# Patient Record
Sex: MALE | Race: WHITE | Employment: OTHER | URBAN - METROPOLITAN AREA
[De-identification: names, ages, dates, MRNs, and addresses within clinical notes are randomized per-mention and may not be internally consistent; named-entity substitution may affect disease eponyms.]

---

## 2017-02-20 RX ORDER — SIMVASTATIN 10 MG
TABLET ORAL
Qty: 30 TABLET | Refills: 2 | Status: SHIPPED | OUTPATIENT
Start: 2017-02-20 | End: 2017-06-06

## 2017-02-20 NOTE — TELEPHONE ENCOUNTER
Requesting Simvastatin. Failed protocol: Needs Lipid panel. FILLED: 11/11/16 #30 with 0 refills.   LOV: 7/26/16     RTC: PRN

## 2017-03-02 RX ORDER — METOPROLOL SUCCINATE 25 MG/1
TABLET, EXTENDED RELEASE ORAL
Qty: 90 TABLET | Refills: 0 | Status: SHIPPED | OUTPATIENT
Start: 2017-03-02 | End: 2017-06-03

## 2017-04-11 NOTE — TELEPHONE ENCOUNTER
Pharmacy calling to ask about the refill of lisinopril. pharmacy has not heard back. call pharmacy at [phone]

## 2017-04-12 RX ORDER — LISINOPRIL 5 MG/1
TABLET ORAL
Qty: 30 TABLET | Refills: 2 | Status: SHIPPED | OUTPATIENT
Start: 2017-04-12 | End: 2017-06-06

## 2017-05-03 ENCOUNTER — OFFICE VISIT (OUTPATIENT)
Dept: FAMILY MEDICINE CLINIC | Facility: CLINIC | Age: 57
End: 2017-05-03

## 2017-05-03 VITALS
HEART RATE: 91 BPM | SYSTOLIC BLOOD PRESSURE: 142 MMHG | RESPIRATION RATE: 20 BRPM | BODY MASS INDEX: 32 KG/M2 | DIASTOLIC BLOOD PRESSURE: 90 MMHG | TEMPERATURE: 98 F | OXYGEN SATURATION: 98 % | WEIGHT: 239 LBS

## 2017-05-03 DIAGNOSIS — L02.215 ABSCESS, PERINEUM: Primary | ICD-10-CM

## 2017-05-03 PROCEDURE — 99213 OFFICE O/P EST LOW 20 MIN: CPT | Performed by: FAMILY MEDICINE

## 2017-05-03 RX ORDER — AMOXICILLIN AND CLAVULANATE POTASSIUM 500; 125 MG/1; MG/1
1 TABLET, FILM COATED ORAL 2 TIMES DAILY
Qty: 20 TABLET | Refills: 0 | Status: SHIPPED | OUTPATIENT
Start: 2017-05-03 | End: 2017-05-13

## 2017-05-03 NOTE — PROGRESS NOTES
Over the years has had recurrent pustules develop in the perineum which she is able to express fluid from. This came up again in the last week. A few days ago tried to express the pus and got a small amount.   Now it is gotten larger and become more tende Naproxen  FAMILY HISTORY  Family History   Problem Relation Age of Onset   • crohn's[other] [OTHER] Mother      2009, cancer,    • lung removed[other] [OTHER] Mother      2013   • lung infection[other] [OTHER] Mother    • drug dependence[other] Deshaun ANDREW

## 2017-06-03 RX ORDER — METOPROLOL SUCCINATE 25 MG/1
TABLET, EXTENDED RELEASE ORAL
Qty: 90 TABLET | Refills: 0 | Status: SHIPPED | OUTPATIENT
Start: 2017-06-03 | End: 2017-09-01

## 2017-06-06 RX ORDER — SIMVASTATIN 10 MG
TABLET ORAL
Qty: 30 TABLET | Refills: 2 | Status: SHIPPED | OUTPATIENT
Start: 2017-06-06 | End: 2017-09-04

## 2017-06-06 RX ORDER — LISINOPRIL 5 MG/1
TABLET ORAL
Qty: 30 TABLET | Refills: 2 | Status: SHIPPED | OUTPATIENT
Start: 2017-06-06 | End: 2017-09-19

## 2017-07-26 NOTE — TELEPHONE ENCOUNTER
Note      Refill Viagra 50 mg.        [facility]  [address]  [place] [phone]        Approve/ deny Viagra 50 mg?    #8  Last OV with RC was 05/13/17  Medication last authorized 08/01/16   #8 with 12 additional

## 2017-09-01 RX ORDER — METOPROLOL SUCCINATE 25 MG/1
TABLET, EXTENDED RELEASE ORAL
Qty: 90 TABLET | Refills: 0 | Status: SHIPPED | OUTPATIENT
Start: 2017-09-01 | End: 2017-12-01

## 2017-09-05 RX ORDER — SIMVASTATIN 10 MG
TABLET ORAL
Qty: 30 TABLET | Refills: 2 | Status: SHIPPED | OUTPATIENT
Start: 2017-09-05 | End: 2017-12-16

## 2017-09-05 NOTE — TELEPHONE ENCOUNTER
Last ov was 5/3/17  Last refill simvastatin 10 mg 30 pills on 6/6/17    .   Cholesterol:     Lab Results  Component Value Date   CHOLEST 242 (H) 07/26/2016   CHOLEST 217 (H) 10/26/2015     Lab Results  Component Value Date   HDL 45 (L) 07/26/2016   HDL 65 1

## 2017-09-19 RX ORDER — LISINOPRIL 5 MG/1
TABLET ORAL
Qty: 30 TABLET | Refills: 2 | Status: SHIPPED | OUTPATIENT
Start: 2017-09-19 | End: 2017-12-16

## 2017-12-01 RX ORDER — METOPROLOL SUCCINATE 25 MG/1
TABLET, EXTENDED RELEASE ORAL
Qty: 90 TABLET | Refills: 0 | Status: SHIPPED | OUTPATIENT
Start: 2017-12-01 | End: 2018-03-03

## 2017-12-18 RX ORDER — LISINOPRIL 5 MG/1
TABLET ORAL
Qty: 30 TABLET | Refills: 0 | Status: SHIPPED | OUTPATIENT
Start: 2017-12-18 | End: 2018-01-17

## 2017-12-18 RX ORDER — SIMVASTATIN 10 MG
TABLET ORAL
Qty: 30 TABLET | Refills: 2 | Status: SHIPPED | OUTPATIENT
Start: 2017-12-18 | End: 2018-03-16

## 2018-01-17 RX ORDER — LISINOPRIL 5 MG/1
TABLET ORAL
Qty: 30 TABLET | Refills: 0 | Status: SHIPPED | OUTPATIENT
Start: 2018-01-17 | End: 2018-02-08

## 2018-02-08 RX ORDER — LISINOPRIL 5 MG/1
5 TABLET ORAL
Qty: 30 TABLET | Refills: 0 | Status: SHIPPED | OUTPATIENT
Start: 2018-02-08 | End: 2018-03-16

## 2018-02-08 NOTE — TELEPHONE ENCOUNTER
Last ov was 5/3/17  Last refill lisinopril 5 mg 30 pills 1/17/18        Pt is due for a f/u on his blood pressure. Please call to schedule one    .   Kidney:    Lab Results  Component Value Date   BUN 16 07/26/2016   BUN 15 10/26/2015   BUN 13 04/

## 2018-03-05 RX ORDER — METOPROLOL SUCCINATE 25 MG/1
TABLET, EXTENDED RELEASE ORAL
Qty: 90 TABLET | Refills: 0 | Status: SHIPPED | OUTPATIENT
Start: 2018-03-05 | End: 2018-06-16

## 2018-03-16 RX ORDER — SIMVASTATIN 10 MG
TABLET ORAL
Qty: 30 TABLET | Refills: 0 | Status: SHIPPED | OUTPATIENT
Start: 2018-03-16 | End: 2018-04-24

## 2018-03-16 RX ORDER — LISINOPRIL 5 MG/1
5 TABLET ORAL
Qty: 30 TABLET | Refills: 0 | Status: SHIPPED | OUTPATIENT
Start: 2018-03-16 | End: 2018-04-24

## 2018-04-24 RX ORDER — LISINOPRIL 5 MG/1
5 TABLET ORAL
Qty: 30 TABLET | Refills: 0 | Status: SHIPPED | OUTPATIENT
Start: 2018-04-24 | End: 2018-05-30

## 2018-04-24 RX ORDER — SIMVASTATIN 10 MG
TABLET ORAL
Qty: 30 TABLET | Refills: 0 | Status: SHIPPED | OUTPATIENT
Start: 2018-04-24 | End: 2018-05-30

## 2018-04-26 ENCOUNTER — PATIENT OUTREACH (OUTPATIENT)
Dept: FAMILY MEDICINE CLINIC | Facility: CLINIC | Age: 58
End: 2018-04-26

## 2018-05-30 RX ORDER — SIMVASTATIN 10 MG
TABLET ORAL
Qty: 30 TABLET | Refills: 0 | Status: ON HOLD | OUTPATIENT
Start: 2018-05-30 | End: 2019-03-04

## 2018-05-30 RX ORDER — LISINOPRIL 5 MG/1
5 TABLET ORAL
Qty: 30 TABLET | Refills: 0 | Status: ON HOLD | OUTPATIENT
Start: 2018-05-30 | End: 2019-03-04

## 2018-05-30 NOTE — TELEPHONE ENCOUNTER
Last ov was 5/3/17  Last refill lisinopril 4/24/18  Last refill simvastatin 4/24/18      Pt is due for a f/u on blood pressure. Please call to schedule one.  He will need to be seen fro any additional refills

## 2018-06-11 RX ORDER — METOPROLOL SUCCINATE 25 MG/1
TABLET, EXTENDED RELEASE ORAL
Qty: 90 TABLET | Refills: 0 | OUTPATIENT
Start: 2018-06-11

## 2018-06-18 RX ORDER — METOPROLOL SUCCINATE 25 MG/1
TABLET, EXTENDED RELEASE ORAL
Qty: 90 TABLET | Refills: 0 | Status: SHIPPED | OUTPATIENT
Start: 2018-06-18 | End: 2019-03-15

## 2018-06-27 RX ORDER — SIMVASTATIN 10 MG
TABLET ORAL
Qty: 30 TABLET | Refills: 0 | OUTPATIENT
Start: 2018-06-27

## 2018-06-27 RX ORDER — LISINOPRIL 5 MG/1
5 TABLET ORAL
Qty: 30 TABLET | Refills: 0 | OUTPATIENT
Start: 2018-06-27

## 2018-07-02 RX ORDER — SIMVASTATIN 10 MG
TABLET ORAL
Qty: 30 TABLET | Refills: 0 | OUTPATIENT
Start: 2018-07-02

## 2018-07-02 RX ORDER — LISINOPRIL 5 MG/1
5 TABLET ORAL
Qty: 30 TABLET | Refills: 0 | OUTPATIENT
Start: 2018-07-02

## 2018-09-14 RX ORDER — METOPROLOL SUCCINATE 25 MG/1
TABLET, EXTENDED RELEASE ORAL
Qty: 90 TABLET | Refills: 0 | OUTPATIENT
Start: 2018-09-14

## 2018-09-14 NOTE — TELEPHONE ENCOUNTER
Call patient. I haven't seen him for medical problems in 2 years. Last visit 16 months ago, and that was for a skin issue.

## 2018-10-01 RX ORDER — METOPROLOL SUCCINATE 25 MG/1
TABLET, EXTENDED RELEASE ORAL
Qty: 90 TABLET | Refills: 0 | OUTPATIENT
Start: 2018-10-01

## 2018-10-29 ENCOUNTER — PATIENT OUTREACH (OUTPATIENT)
Dept: FAMILY MEDICINE CLINIC | Facility: CLINIC | Age: 58
End: 2018-10-29

## 2018-12-20 ENCOUNTER — PATIENT OUTREACH (OUTPATIENT)
Dept: FAMILY MEDICINE CLINIC | Facility: CLINIC | Age: 58
End: 2018-12-20

## 2019-01-24 ENCOUNTER — PATIENT OUTREACH (OUTPATIENT)
Dept: FAMILY MEDICINE CLINIC | Facility: CLINIC | Age: 59
End: 2019-01-24

## 2019-02-21 ENCOUNTER — PATIENT OUTREACH (OUTPATIENT)
Dept: FAMILY MEDICINE CLINIC | Facility: CLINIC | Age: 59
End: 2019-02-21

## 2019-02-21 NOTE — PROGRESS NOTES
Please call patient to schedule diabetes follow up with Dr Genie Escalera. A PROTEGOt message has not been read.

## 2019-03-03 ENCOUNTER — APPOINTMENT (OUTPATIENT)
Dept: CT IMAGING | Age: 59
End: 2019-03-03
Attending: EMERGENCY MEDICINE
Payer: COMMERCIAL

## 2019-03-03 ENCOUNTER — APPOINTMENT (OUTPATIENT)
Dept: GENERAL RADIOLOGY | Age: 59
End: 2019-03-03
Attending: EMERGENCY MEDICINE
Payer: COMMERCIAL

## 2019-03-03 ENCOUNTER — HOSPITAL ENCOUNTER (OUTPATIENT)
Facility: HOSPITAL | Age: 59
Setting detail: OBSERVATION
Discharge: HOME | End: 2019-03-04
Attending: EMERGENCY MEDICINE | Admitting: HOSPITALIST
Payer: COMMERCIAL

## 2019-03-03 DIAGNOSIS — R07.9 CHEST PAIN, RULE OUT ACUTE MYOCARDIAL INFARCTION: Primary | ICD-10-CM

## 2019-03-03 LAB
ALBUMIN SERPL-MCNC: 3.8 G/DL (ref 3.4–5)
ALBUMIN/GLOB SERPL: 0.9 {RATIO} (ref 1–2)
ALP LIVER SERPL-CCNC: 61 U/L (ref 45–117)
ALT SERPL-CCNC: 86 U/L (ref 16–61)
ANION GAP SERPL CALC-SCNC: 7 MMOL/L (ref 0–18)
AST SERPL-CCNC: 38 U/L (ref 15–37)
BASOPHILS # BLD AUTO: 0.09 X10(3) UL (ref 0–0.2)
BASOPHILS NFR BLD AUTO: 0.9 %
BILIRUB SERPL-MCNC: 0.2 MG/DL (ref 0.1–2)
BILIRUB UR QL STRIP.AUTO: NEGATIVE
BUN BLD-MCNC: 17 MG/DL (ref 7–18)
BUN/CREAT SERPL: 14.5 (ref 10–20)
CALCIUM BLD-MCNC: 8.7 MG/DL (ref 8.5–10.1)
CHLORIDE SERPL-SCNC: 103 MMOL/L (ref 98–107)
CLARITY UR REFRACT.AUTO: CLEAR
CO2 SERPL-SCNC: 27 MMOL/L (ref 21–32)
COLOR UR AUTO: YELLOW
CREAT BLD-MCNC: 1.17 MG/DL (ref 0.7–1.3)
DEPRECATED RDW RBC AUTO: 45 FL (ref 35.1–46.3)
EOSINOPHIL # BLD AUTO: 0.18 X10(3) UL (ref 0–0.7)
EOSINOPHIL NFR BLD AUTO: 1.7 %
ERYTHROCYTE [DISTWIDTH] IN BLOOD BY AUTOMATED COUNT: 13.2 % (ref 11–15)
GLOBULIN PLAS-MCNC: 4.1 G/DL (ref 2.8–4.4)
GLUCOSE BLD-MCNC: 150 MG/DL (ref 70–99)
GLUCOSE UR STRIP.AUTO-MCNC: 100 MG/DL
HCT VFR BLD AUTO: 47.7 % (ref 39–53)
HGB BLD-MCNC: 16 G/DL (ref 13–17.5)
IMM GRANULOCYTES # BLD AUTO: 0.04 X10(3) UL (ref 0–1)
IMM GRANULOCYTES NFR BLD: 0.4 %
KETONES UR STRIP.AUTO-MCNC: 15 MG/DL
LEUKOCYTE ESTERASE UR QL STRIP.AUTO: NEGATIVE
LIPASE SERPL-CCNC: 93 U/L (ref 73–393)
LYMPHOCYTES # BLD AUTO: 2.49 X10(3) UL (ref 1–4)
LYMPHOCYTES NFR BLD AUTO: 23.6 %
M PROTEIN MFR SERPL ELPH: 7.9 G/DL (ref 6.4–8.2)
MCH RBC QN AUTO: 31.1 PG (ref 26–34)
MCHC RBC AUTO-ENTMCNC: 33.5 G/DL (ref 31–37)
MCV RBC AUTO: 92.6 FL (ref 80–100)
MONOCYTES # BLD AUTO: 0.94 X10(3) UL (ref 0.1–1)
MONOCYTES NFR BLD AUTO: 8.9 %
NEUTROPHILS # BLD AUTO: 6.82 X10 (3) UL (ref 1.5–7.7)
NEUTROPHILS # BLD AUTO: 6.82 X10(3) UL (ref 1.5–7.7)
NEUTROPHILS NFR BLD AUTO: 64.5 %
NITRITE UR QL STRIP.AUTO: NEGATIVE
OSMOLALITY SERPL CALC.SUM OF ELEC: 288 MOSM/KG (ref 275–295)
PH UR STRIP.AUTO: 6.5 [PH] (ref 4.5–8)
PLATELET # BLD AUTO: 274 10(3)UL (ref 150–450)
POTASSIUM SERPL-SCNC: 4.1 MMOL/L (ref 3.5–5.1)
PROT UR STRIP.AUTO-MCNC: NEGATIVE MG/DL
RBC # BLD AUTO: 5.15 X10(6)UL (ref 4.3–5.7)
RBC UR QL AUTO: NEGATIVE
SODIUM SERPL-SCNC: 137 MMOL/L (ref 136–145)
SP GR UR STRIP.AUTO: 1.02 (ref 1–1.03)
TROPONIN I SERPL-MCNC: <0.045 NG/ML (ref ?–0.04)
TROPONIN I SERPL-MCNC: <0.045 NG/ML (ref ?–0.04)
UROBILINOGEN UR STRIP.AUTO-MCNC: 0.2 MG/DL
WBC # BLD AUTO: 10.6 X10(3) UL (ref 4–11)

## 2019-03-03 PROCEDURE — 99220 INITIAL OBSERVATION CARE,LEVL III: CPT | Performed by: HOSPITALIST

## 2019-03-03 PROCEDURE — 71045 X-RAY EXAM CHEST 1 VIEW: CPT | Performed by: EMERGENCY MEDICINE

## 2019-03-03 PROCEDURE — 74176 CT ABD & PELVIS W/O CONTRAST: CPT | Performed by: EMERGENCY MEDICINE

## 2019-03-03 RX ORDER — MAGNESIUM HYDROXIDE/ALUMINUM HYDROXICE/SIMETHICONE 120; 1200; 1200 MG/30ML; MG/30ML; MG/30ML
30 SUSPENSION ORAL ONCE
Status: COMPLETED | OUTPATIENT
Start: 2019-03-03 | End: 2019-03-03

## 2019-03-03 RX ORDER — HYDROMORPHONE HYDROCHLORIDE 1 MG/ML
0.5 INJECTION, SOLUTION INTRAMUSCULAR; INTRAVENOUS; SUBCUTANEOUS ONCE
Status: COMPLETED | OUTPATIENT
Start: 2019-03-03 | End: 2019-03-03

## 2019-03-04 ENCOUNTER — APPOINTMENT (OUTPATIENT)
Dept: CV DIAGNOSTICS | Facility: HOSPITAL | Age: 59
End: 2019-03-04
Attending: HOSPITALIST
Payer: COMMERCIAL

## 2019-03-04 ENCOUNTER — APPOINTMENT (OUTPATIENT)
Dept: CV DIAGNOSTICS | Facility: HOSPITAL | Age: 59
End: 2019-03-04
Attending: INTERNAL MEDICINE
Payer: COMMERCIAL

## 2019-03-04 VITALS
OXYGEN SATURATION: 100 % | BODY MASS INDEX: 32 KG/M2 | RESPIRATION RATE: 18 BRPM | TEMPERATURE: 98 F | SYSTOLIC BLOOD PRESSURE: 150 MMHG | HEART RATE: 83 BPM | DIASTOLIC BLOOD PRESSURE: 89 MMHG | WEIGHT: 239 LBS

## 2019-03-04 LAB
ATRIAL RATE: 78 BPM
ATRIAL RATE: 80 BPM
CHOLEST SMN-MCNC: 233 MG/DL (ref ?–200)
HDLC SERPL-MCNC: 51 MG/DL (ref 40–59)
LDLC SERPL CALC-MCNC: 138 MG/DL (ref ?–100)
NONHDLC SERPL-MCNC: 182 MG/DL (ref ?–130)
P AXIS: 11 DEGREES
P AXIS: 39 DEGREES
P-R INTERVAL: 130 MS
P-R INTERVAL: 136 MS
Q-T INTERVAL: 392 MS
Q-T INTERVAL: 392 MS
QRS DURATION: 92 MS
QRS DURATION: 96 MS
QTC CALCULATION (BEZET): 446 MS
QTC CALCULATION (BEZET): 452 MS
R AXIS: 28 DEGREES
R AXIS: 4 DEGREES
T AXIS: -72 DEGREES
T AXIS: 101 DEGREES
TRIGL SERPL-MCNC: 218 MG/DL (ref 30–149)
TROPONIN I SERPL-MCNC: <0.045 NG/ML (ref ?–0.04)
TROPONIN I SERPL-MCNC: <0.045 NG/ML (ref ?–0.04)
VENTRICULAR RATE: 78 BPM
VENTRICULAR RATE: 80 BPM
VLDLC SERPL CALC-MCNC: 44 MG/DL (ref 0–30)

## 2019-03-04 PROCEDURE — 99217 OBSERVATION CARE DISCHARGE: CPT | Performed by: INTERNAL MEDICINE

## 2019-03-04 PROCEDURE — 93018 CV STRESS TEST I&R ONLY: CPT | Performed by: INTERNAL MEDICINE

## 2019-03-04 PROCEDURE — 78452 HT MUSCLE IMAGE SPECT MULT: CPT | Performed by: INTERNAL MEDICINE

## 2019-03-04 PROCEDURE — 93306 TTE W/DOPPLER COMPLETE: CPT | Performed by: HOSPITALIST

## 2019-03-04 PROCEDURE — 93017 CV STRESS TEST TRACING ONLY: CPT | Performed by: INTERNAL MEDICINE

## 2019-03-04 RX ORDER — ACETAMINOPHEN 325 MG/1
650 TABLET ORAL EVERY 6 HOURS PRN
Status: DISCONTINUED | OUTPATIENT
Start: 2019-03-04 | End: 2019-03-04

## 2019-03-04 RX ORDER — METOPROLOL SUCCINATE 25 MG/1
25 TABLET, EXTENDED RELEASE ORAL ONCE
Status: DISCONTINUED | OUTPATIENT
Start: 2019-03-04 | End: 2019-03-04

## 2019-03-04 RX ORDER — ONDANSETRON 2 MG/ML
4 INJECTION INTRAMUSCULAR; INTRAVENOUS EVERY 6 HOURS PRN
Status: DISCONTINUED | OUTPATIENT
Start: 2019-03-04 | End: 2019-03-04

## 2019-03-04 RX ORDER — SIMVASTATIN 10 MG
10 TABLET ORAL NIGHTLY
Qty: 30 TABLET | Refills: 1 | Status: SHIPPED | OUTPATIENT
Start: 2019-03-04 | End: 2019-04-26

## 2019-03-04 RX ORDER — LORAZEPAM 0.5 MG/1
0.5 TABLET ORAL EVERY 4 HOURS PRN
Status: DISCONTINUED | OUTPATIENT
Start: 2019-03-04 | End: 2019-03-04

## 2019-03-04 RX ORDER — LISINOPRIL 20 MG/1
20 TABLET ORAL
Status: DISCONTINUED | OUTPATIENT
Start: 2019-03-04 | End: 2019-03-04

## 2019-03-04 RX ORDER — METOCLOPRAMIDE HYDROCHLORIDE 5 MG/ML
10 INJECTION INTRAMUSCULAR; INTRAVENOUS EVERY 8 HOURS PRN
Status: DISCONTINUED | OUTPATIENT
Start: 2019-03-04 | End: 2019-03-04

## 2019-03-04 RX ORDER — LISINOPRIL 5 MG/1
5 TABLET ORAL
Status: DISCONTINUED | OUTPATIENT
Start: 2019-03-04 | End: 2019-03-04

## 2019-03-04 RX ORDER — PRAVASTATIN SODIUM 20 MG
20 TABLET ORAL NIGHTLY
Status: DISCONTINUED | OUTPATIENT
Start: 2019-03-04 | End: 2019-03-04

## 2019-03-04 RX ORDER — LISINOPRIL 20 MG/1
20 TABLET ORAL
Qty: 30 TABLET | Refills: 3 | Status: SHIPPED | OUTPATIENT
Start: 2019-03-05 | End: 2019-07-09

## 2019-03-04 RX ORDER — HYDRALAZINE HYDROCHLORIDE 20 MG/ML
10 INJECTION INTRAMUSCULAR; INTRAVENOUS EVERY 6 HOURS PRN
Status: DISCONTINUED | OUTPATIENT
Start: 2019-03-04 | End: 2019-03-04

## 2019-03-04 RX ORDER — METOPROLOL SUCCINATE 25 MG/1
25 TABLET, EXTENDED RELEASE ORAL
Status: DISCONTINUED | OUTPATIENT
Start: 2019-03-04 | End: 2019-03-04

## 2019-03-04 RX ORDER — ENOXAPARIN SODIUM 100 MG/ML
40 INJECTION SUBCUTANEOUS DAILY
Status: DISCONTINUED | OUTPATIENT
Start: 2019-03-04 | End: 2019-03-04

## 2019-03-04 NOTE — PLAN OF CARE
Nuclear stress test negative for reversible ischemia w/ EF ~56%. Echo reviewed, shows no RWMA w/ preserved LVSF. OK for home cardiac perspective. F/u PCP as directed. See APN in Cardiology office 1-2 weeks.   If symptoms recur, will need to give conside

## 2019-03-04 NOTE — PLAN OF CARE
NURSING DISCHARGE NOTE    Discharged Home via Ambulatory. Accompanied by Support staff  Belongings Taken by patient/family     D/c accompanied by RN and spouse.

## 2019-03-04 NOTE — CONSULTS
Cardiology (consult dictated)    Assessment:  1. Atypical chest pain. Normal troponins; labile ST-T abnormalities. 2. HTN    3. DM    4.  Elevated LFTs      Plan:  Stress test    Thank you

## 2019-03-04 NOTE — PROGRESS NOTES
[facility]  Progress Note     Nhi Cruz Patient Status:  Observation    1960 MRN [id number]   [facility] 2NE-A Attending Marilu Albert MD   Hosp Day # 0 PCP Graeme Boxer, MD     Chief Complaint: chest pain    S: Patient pending  3. Echo  4. Cardiology consulted  2. Essential HTN  1. BB/ACEI  3. Dyslipidemia  1.  Statin       Plan of care: await stress test resutls    Quality:  · DVT Prophylaxis: lovenox  · CODE status: Full  · Perez: none  · Central line: none    Estimated

## 2019-03-04 NOTE — ED PROVIDER NOTES
Patient Seen in: THE [facility] Emergency Department In [place]    History   Patient presents with:  Abdomen/Flank Pain (GI/)    Stated Complaint: abd pain     HPI    13-year-old white male who presents emerged from today for complaint of epigastric abdomin HPI.  Constitutional and vital signs reviewed. All other systems reviewed and negative except as noted above.     Physical Exam     ED Triage Vitals [03/03/19 2042]   BP (!) 196/112   Pulse 92   Resp 18   Temp 98.1 °F (36.7 °C)   Temp src Temporal   Sp ---------                               -----------         ------                     CBC W/ DIFFERENTIAL[[id number]]                              Final result                 Please view results for these tests on the individual orders.    RAINBOW DRAW B pulmonary or pleural disease.        Impression     CONCLUSION:    1. Diverticular disease.  No free fluid. 2. The left kidney is not visualized, correlate with surgical removal.  3. Fatty infiltration of the liver.   4. If patient has previous CTs of the provider specified.       Medications Prescribed:  Current Discharge Medication List        Present on Admission  Date Reviewed: 4/29/2016          ICD-10-CM Noted POA    Chest pain, rule out acute myocardial infarction R07.9 3/3/2019 Unknown

## 2019-03-04 NOTE — PLAN OF CARE
CARDIOVASCULAR - ADULT    • Maintains optimal cardiac output and hemodynamic stability Progressing    • Absence of cardiac arrhythmias or at baseline Progressing        Received report from [facility]. Pt's b/p 177/98.  Nurse inquired whether any medica

## 2019-03-04 NOTE — CONSULTS
[facility]    PATIENT'S NAME: Israel Frieda   ATTENDING PHYSICIAN: Jerilee Lefort, MD   CONSULTING PHYSICIAN: Eron Montoya M.D.    PATIENT ACCOUNT#:   [de-identified]    LOCATION:  [address] [place] [address]  MEDICAL RECORD #:   [id number]       DATE OF BIRTH:  [birth year] q.a.m., simvastatin 10 mg at bedtime. ALLERGIES:  Naprosyn, as above. SOCIAL HISTORY:  He is  with 2 children. Works as a  and has high stress from his job.   He drinks 1 cup of coffee a day and has 1 to 2 glasses of wine each pain with some typical but mostly atypical features, now resolved. Normal troponins despite prolonged pain. He does, however, have labile ST and T-wave abnormalities, which may be related to his blood pressure, but could also be a sign of ischemia.   Norm

## 2019-03-04 NOTE — PLAN OF CARE
CARDIOVASCULAR - ADULT    • Maintains optimal cardiac output and hemodynamic stability Progressing    • Absence of cardiac arrhythmias or at baseline Progressing          Denies chest pain, no sob  Troponin normal, lisinopril given for high bp  2d Echo ord

## 2019-03-04 NOTE — H&P
[facility]  History and Physical     [facility] Patient Status:  Emergency    1960 MRN [id number]   [facility] [place] Attending Amy Mtz MD   Hosp Day # 0 PCP Colletta Silos, MD     Chief Complaint Other (lyme disease[other]) Sister    • Other (west nile virus[other]) Sister    • Other (copperhead snake bite[other]) Sister         Allergies:   Naproxen                ANAPHYLAXIS    Comment:Passed out    Medications:    No current facility-administere extremities. Extremities: No edema or cyanosis. Integument: No rashes or lesions. Psychiatric: Appropriate mood and affect.       Diagnostic Data:      Labs:  Recent Labs   Lab  03/03/19   2050   WBC  10.6   HGB  16.0   MCV  92.6   PLT  274.0       Rece

## 2019-03-05 ENCOUNTER — PATIENT OUTREACH (OUTPATIENT)
Dept: CASE MANAGEMENT | Age: 59
End: 2019-03-05

## 2019-03-05 DIAGNOSIS — Z02.9 ENCOUNTERS FOR UNSPECIFIED ADMINISTRATIVE PURPOSE: ICD-10-CM

## 2019-03-05 NOTE — PROGRESS NOTES
Initial Post Discharge Follow Up   Discharge Date: 3/4/19  Contact Date: 3/5/2019    Consent Verification:  Assessment Completed With: Patient  HIPAA Verified?   Yes    Discharge Dx:   Atypical chest pain, benign essential HTN, hyperlipidemia    General: Swabs (ALCOHOL WIPES) Does not apply Pads 1 each by Does not apply route daily.  Disp: 100 each Rfl: 0   Glucose Blood (ONETOUCH VERIO) In Vitro Strip Test twice daily Disp: 100 each Rfl: 2   Blood Glucose Monitoring Suppl ([employer] SYSTEM) W/DEVIC [id number]  [phone]          PCP TCM/HFU appointment: scheduled at D/C within 7-14 days  yes     NCM Reviewed/scheduled/rescheduled PCP TCM/HFU appointment with pt:  Yes      Have you made all of your follow up appointments?  yes    Is there any reason

## 2019-03-13 ENCOUNTER — TELEPHONE (OUTPATIENT)
Dept: FAMILY MEDICINE CLINIC | Facility: CLINIC | Age: 59
End: 2019-03-13

## 2019-03-13 NOTE — DISCHARGE SUMMARY
[facility]  DISCHARGE SUMMARY     Kaiden Hull Patient Status:  Observation    1960 MRN [id number]   [facility] 2NE-A Attending No att. providers found   Hosp Day # 0 PCP Francois Byrd MD     Date of Admission: 3/3/2019  Date o Wipes Pads      1 each by Does not apply route daily. Quantity:  100 each  Refills:  0     aspirin 81 MG Tabs      Take 81 mg by mouth daily.    Refills:  0     Glucose Blood Strp  Commonly known as:  ONETOUCH VERIO      Test twice daily   Quantity:  100

## 2019-03-13 NOTE — TELEPHONE ENCOUNTER
Her  is coming in next week to see Edwinmajo Castrejonman. She would like to let CZ know about some things that she does not think her  will be honest about at his appt. Please call her today.   Her  is not home and doesn't want this call coming in when

## 2019-03-13 NOTE — TELEPHONE ENCOUNTER
Patient has OV scheduled with  next week. Wife is calling to report various concerns. Patient was recently hospitalized over night at THE [facility] for stomach pain/ cardiac testing.  Ella Wayne has not been in to see Henrique Arnold since May 2017    Snoring has

## 2019-03-15 ENCOUNTER — OFFICE VISIT (OUTPATIENT)
Dept: FAMILY MEDICINE CLINIC | Facility: CLINIC | Age: 59
End: 2019-03-15
Payer: COMMERCIAL

## 2019-03-15 ENCOUNTER — APPOINTMENT (OUTPATIENT)
Dept: LAB | Age: 59
End: 2019-03-15
Attending: FAMILY MEDICINE
Payer: COMMERCIAL

## 2019-03-15 VITALS
DIASTOLIC BLOOD PRESSURE: 80 MMHG | WEIGHT: 232 LBS | HEART RATE: 76 BPM | SYSTOLIC BLOOD PRESSURE: 130 MMHG | RESPIRATION RATE: 18 BRPM | BODY MASS INDEX: 30.75 KG/M2 | TEMPERATURE: 98 F | OXYGEN SATURATION: 98 % | HEIGHT: 73 IN

## 2019-03-15 DIAGNOSIS — Z12.5 SCREENING FOR PROSTATE CANCER: ICD-10-CM

## 2019-03-15 DIAGNOSIS — E11.65 UNCONTROLLED TYPE 2 DIABETES MELLITUS WITH HYPERGLYCEMIA (HCC): ICD-10-CM

## 2019-03-15 DIAGNOSIS — Z00.00 ROUTINE GENERAL MEDICAL EXAMINATION AT A HEALTH CARE FACILITY: Primary | ICD-10-CM

## 2019-03-15 DIAGNOSIS — E78.1 HYPERTRIGLYCERIDEMIA: ICD-10-CM

## 2019-03-15 DIAGNOSIS — Z99.89 OSA ON CPAP: ICD-10-CM

## 2019-03-15 DIAGNOSIS — F52.8 PSYCHOSEXUAL DYSFUNCTION WITH INHIBITED SEXUAL EXCITEMENT: ICD-10-CM

## 2019-03-15 DIAGNOSIS — I10 BENIGN ESSENTIAL HTN: ICD-10-CM

## 2019-03-15 DIAGNOSIS — G47.33 OSA ON CPAP: ICD-10-CM

## 2019-03-15 PROBLEM — R07.9 CHEST PAIN, RULE OUT ACUTE MYOCARDIAL INFARCTION: Status: RESOLVED | Noted: 2019-03-03 | Resolved: 2019-03-15

## 2019-03-15 LAB
ALBUMIN SERPL-MCNC: 4 G/DL (ref 3.4–5)
ALBUMIN/GLOB SERPL: 1 {RATIO} (ref 1–2)
ALP LIVER SERPL-CCNC: 59 U/L (ref 45–117)
ALT SERPL-CCNC: 75 U/L (ref 16–61)
ANION GAP SERPL CALC-SCNC: 8 MMOL/L (ref 0–18)
AST SERPL-CCNC: 31 U/L (ref 15–37)
BILIRUB SERPL-MCNC: 0.8 MG/DL (ref 0.1–2)
BUN BLD-MCNC: 20 MG/DL (ref 7–18)
BUN/CREAT SERPL: 13.3 (ref 10–20)
CALCIUM BLD-MCNC: 9.4 MG/DL (ref 8.5–10.1)
CHLORIDE SERPL-SCNC: 104 MMOL/L (ref 98–107)
CHOLEST SMN-MCNC: 174 MG/DL (ref ?–200)
CO2 SERPL-SCNC: 27 MMOL/L (ref 21–32)
COMPLEXED PSA SERPL-MCNC: 0.59 NG/ML (ref ?–4)
CREAT BLD-MCNC: 1.5 MG/DL (ref 0.7–1.3)
CREAT UR-SCNC: 357 MG/DL
EST. AVERAGE GLUCOSE BLD GHB EST-MCNC: 131 MG/DL (ref 68–126)
GLOBULIN PLAS-MCNC: 4.2 G/DL (ref 2.8–4.4)
GLUCOSE BLD-MCNC: 118 MG/DL (ref 70–99)
HBA1C MFR BLD HPLC: 6.2 % (ref ?–5.7)
HDLC SERPL-MCNC: 44 MG/DL (ref 40–59)
LDLC SERPL CALC-MCNC: 104 MG/DL (ref ?–100)
M PROTEIN MFR SERPL ELPH: 8.2 G/DL (ref 6.4–8.2)
MICROALBUMIN UR-MCNC: 1.88 MG/DL
MICROALBUMIN/CREAT 24H UR-RTO: 5.3 UG/MG (ref ?–30)
NONHDLC SERPL-MCNC: 130 MG/DL (ref ?–130)
OSMOLALITY SERPL CALC.SUM OF ELEC: 292 MOSM/KG (ref 275–295)
POTASSIUM SERPL-SCNC: 4.9 MMOL/L (ref 3.5–5.1)
SODIUM SERPL-SCNC: 139 MMOL/L (ref 136–145)
TRIGL SERPL-MCNC: 129 MG/DL (ref 30–149)
VLDLC SERPL CALC-MCNC: 26 MG/DL (ref 0–30)

## 2019-03-15 PROCEDURE — 83036 HEMOGLOBIN GLYCOSYLATED A1C: CPT

## 2019-03-15 PROCEDURE — 82043 UR ALBUMIN QUANTITATIVE: CPT

## 2019-03-15 PROCEDURE — 82570 ASSAY OF URINE CREATININE: CPT

## 2019-03-15 PROCEDURE — 36415 COLL VENOUS BLD VENIPUNCTURE: CPT

## 2019-03-15 PROCEDURE — 99396 PREV VISIT EST AGE 40-64: CPT | Performed by: FAMILY MEDICINE

## 2019-03-15 PROCEDURE — 80053 COMPREHEN METABOLIC PANEL: CPT

## 2019-03-15 PROCEDURE — 80061 LIPID PANEL: CPT

## 2019-03-15 RX ORDER — SILDENAFIL 50 MG/1
50 TABLET, FILM COATED ORAL
Qty: 8 TABLET | Refills: 12 | Status: SHIPPED | OUTPATIENT
Start: 2019-03-15

## 2019-03-15 RX ORDER — METOPROLOL SUCCINATE 25 MG/1
25 TABLET, EXTENDED RELEASE ORAL
Qty: 90 TABLET | Refills: 1 | Status: SHIPPED | OUTPATIENT
Start: 2019-03-15 | End: 2019-09-11

## 2019-03-15 NOTE — PROGRESS NOTES
HPI:  Here for a physical.  Recently in the hospital with epigastric abdominal pain. Responded promptly to GI cocktail.   However abnormalities on the EKG had an ambulance over for a stress echocardiogram.  The echo portion was normal.  He was discharged h Swabs (ALCOHOL WIPES) Does not apply Pads 1 each by Does not apply route daily.  Disp: 100 each Rfl: 0   Glucose Blood (ONETOUCH VERIO) In Vitro Strip Test twice daily Disp: 100 each Rfl: 2   Blood Glucose Monitoring Suppl ([employer] SYSTEM) W/DEVIC complaints of  hemoptysis. GASTROINTESTINAL: See HPI denies hematochezia and melena. No complaints of any new constipation or diarrhea. No complaints of abdominal pain or cramping. Regular stools.   GENITOURINARY: No complaints of dysuria, urgency or freq percussion. ABDOMEN: Soft, nontender, nondistended, NABS x 4 quadrants. No HSM; no masses; no bruits. GENITOURINARY: Scrotum and testes without lesions, no hernias. Penis shaft and glans without lesions, no discharge.   RECTAL: No rectal masses; prostate within the next 30 days as well as to make all appointments for referrals if given within the next 30 days. Patient understands to contact office if unable to do so.

## 2019-03-29 RX ORDER — LISINOPRIL 20 MG/1
20 TABLET ORAL
COMMUNITY
Start: 2019-03-05

## 2019-03-29 RX ORDER — METOPROLOL SUCCINATE 25 MG/1
TABLET, EXTENDED RELEASE ORAL
COMMUNITY
Start: 2018-06-18

## 2019-03-29 RX ORDER — SIMVASTATIN 10 MG
10 TABLET ORAL
COMMUNITY
Start: 2019-03-04

## 2019-04-26 RX ORDER — SIMVASTATIN 10 MG
TABLET ORAL
Qty: 30 TABLET | Refills: 1 | Status: SHIPPED | OUTPATIENT
Start: 2019-04-26 | End: 2019-05-21

## 2019-05-21 RX ORDER — SIMVASTATIN 10 MG
TABLET ORAL
Qty: 30 TABLET | Refills: 0 | Status: SHIPPED | OUTPATIENT
Start: 2019-05-21 | End: 2019-06-13

## 2019-06-13 RX ORDER — SIMVASTATIN 10 MG
TABLET ORAL
Qty: 30 TABLET | Refills: 0 | Status: SHIPPED | OUTPATIENT
Start: 2019-06-13 | End: 2019-07-10

## 2019-07-09 RX ORDER — LISINOPRIL 20 MG/1
20 TABLET ORAL
Qty: 30 TABLET | Refills: 3 | Status: SHIPPED | OUTPATIENT
Start: 2019-07-09 | End: 2019-11-06

## 2019-07-10 RX ORDER — SIMVASTATIN 10 MG
TABLET ORAL
Qty: 30 TABLET | Refills: 0 | Status: SHIPPED | OUTPATIENT
Start: 2019-07-10 | End: 2019-08-02

## 2019-08-02 RX ORDER — SIMVASTATIN 10 MG
TABLET ORAL
Qty: 30 TABLET | Refills: 0 | Status: SHIPPED | OUTPATIENT
Start: 2019-08-02 | End: 2019-11-14

## 2019-09-11 DIAGNOSIS — I10 BENIGN ESSENTIAL HTN: ICD-10-CM

## 2019-09-11 RX ORDER — METOPROLOL SUCCINATE 25 MG/1
TABLET, EXTENDED RELEASE ORAL
Qty: 90 TABLET | Refills: 1 | Status: SHIPPED | OUTPATIENT
Start: 2019-09-11 | End: 2020-01-21

## 2019-09-17 ENCOUNTER — OFFICE VISIT (OUTPATIENT)
Dept: FAMILY MEDICINE CLINIC | Facility: CLINIC | Age: 59
End: 2019-09-17
Payer: COMMERCIAL

## 2019-09-17 VITALS
DIASTOLIC BLOOD PRESSURE: 90 MMHG | HEIGHT: 73 IN | WEIGHT: 247 LBS | BODY MASS INDEX: 32.74 KG/M2 | TEMPERATURE: 98 F | RESPIRATION RATE: 18 BRPM | SYSTOLIC BLOOD PRESSURE: 140 MMHG | HEART RATE: 64 BPM

## 2019-09-17 DIAGNOSIS — E11.9 CONTROLLED TYPE 2 DIABETES MELLITUS WITHOUT COMPLICATION, WITHOUT LONG-TERM CURRENT USE OF INSULIN (HCC): ICD-10-CM

## 2019-09-17 DIAGNOSIS — Z23 FLU VACCINE NEED: ICD-10-CM

## 2019-09-17 DIAGNOSIS — I10 BENIGN ESSENTIAL HTN: Primary | ICD-10-CM

## 2019-09-17 PROCEDURE — 99213 OFFICE O/P EST LOW 20 MIN: CPT | Performed by: FAMILY MEDICINE

## 2019-09-17 PROCEDURE — 90686 IIV4 VACC NO PRSV 0.5 ML IM: CPT | Performed by: FAMILY MEDICINE

## 2019-09-17 PROCEDURE — 90471 IMMUNIZATION ADMIN: CPT | Performed by: FAMILY MEDICINE

## 2019-09-17 NOTE — PROGRESS NOTES
Here in follow-up for hypertension. He is retiring in a week and a half. He has not been using his CPAP because he works almost 12 hours a day and then comes home eats dinner lays down on the couch where he falls asleep. CPAP is upstairs by the bed.   Af aspirin 81 MG Oral Tab Take 81 mg by mouth daily. Disp:  Rfl:    Alcohol Swabs (ALCOHOL WIPES) Does not apply Pads 1 each by Does not apply route daily.  Disp: 100 each Rfl: 0   Glucose Blood (ONETOUCH VERIO) In Vitro Strip Test twice daily Disp: 100 each next year. Better use of CPAP and more exercise to lower the blood pressure. Will possibly be moving to [place] where his wife's family and many of her friends live.   There is also concern about [address] where there is son lives or staying in [facility]

## 2019-09-17 NOTE — PATIENT INSTRUCTIONS
Expect bp to improve with better use of cpap and more exercise once you are retired. Congratulations,.

## 2019-09-18 LAB
ALBUMIN/GLOBULIN RATIO: 1.5 (CALC) (ref 1–2.5)
ALBUMIN: 4.4 G/DL (ref 3.6–5.1)
ALKALINE PHOSPHATASE: 57 U/L (ref 40–115)
ALT: 60 U/L (ref 9–46)
AST: 29 U/L (ref 10–35)
BILIRUBIN, TOTAL: 0.8 MG/DL (ref 0.2–1.2)
BUN: 21 MG/DL (ref 7–25)
CALCIUM: 9.5 MG/DL (ref 8.6–10.3)
CARBON DIOXIDE: 28 MMOL/L (ref 20–32)
CHLORIDE: 102 MMOL/L (ref 98–110)
CHOL/HDLC RATIO: 3.7 (CALC)
CHOLESTEROL, TOTAL: 183 MG/DL
CREATININE: 1.24 MG/DL (ref 0.7–1.25)
EGFR IF AFRICN AM: 73 ML/MIN/1.73M2
EGFR IF NONAFRICN AM: 63 ML/MIN/1.73M2
GLOBULIN: 2.9 G/DL (CALC) (ref 1.9–3.7)
GLUCOSE: 168 MG/DL (ref 65–99)
HDL CHOLESTEROL: 50 MG/DL
HEMOGLOBIN A1C: 7.1 % OF TOTAL HGB
LDL-CHOLESTEROL: 92 MG/DL (CALC)
NON-HDL CHOLESTEROL: 133 MG/DL (CALC)
POTASSIUM: 4.8 MMOL/L (ref 3.5–5.3)
PROTEIN, TOTAL: 7.3 G/DL (ref 6.1–8.1)
SODIUM: 139 MMOL/L (ref 135–146)
TRIGLYCERIDES: 290 MG/DL

## 2019-11-06 RX ORDER — LISINOPRIL 20 MG/1
TABLET ORAL
Qty: 90 TABLET | Refills: 1 | Status: SHIPPED | OUTPATIENT
Start: 2019-11-06 | End: 2020-04-19

## 2019-11-15 RX ORDER — SIMVASTATIN 10 MG
10 TABLET ORAL NIGHTLY
Qty: 30 TABLET | Refills: 0 | Status: SHIPPED | OUTPATIENT
Start: 2019-11-15 | End: 2019-12-09

## 2019-12-09 RX ORDER — SIMVASTATIN 10 MG
TABLET ORAL
Qty: 30 TABLET | Refills: 0 | Status: SHIPPED | OUTPATIENT
Start: 2019-12-09 | End: 2020-01-21

## 2020-01-21 DIAGNOSIS — I10 BENIGN ESSENTIAL HTN: ICD-10-CM

## 2020-01-21 RX ORDER — SIMVASTATIN 10 MG
10 TABLET ORAL NIGHTLY
Qty: 30 TABLET | Refills: 0 | Status: SHIPPED | OUTPATIENT
Start: 2020-01-21 | End: 2020-02-10

## 2020-01-21 RX ORDER — METOPROLOL SUCCINATE 25 MG/1
25 TABLET, EXTENDED RELEASE ORAL
Qty: 90 TABLET | Refills: 1 | Status: SHIPPED | OUTPATIENT
Start: 2020-01-21 | End: 2020-10-01

## 2020-02-10 ENCOUNTER — PATIENT MESSAGE (OUTPATIENT)
Dept: FAMILY MEDICINE CLINIC | Facility: CLINIC | Age: 60
End: 2020-02-10

## 2020-02-10 RX ORDER — SIMVASTATIN 10 MG
10 TABLET ORAL NIGHTLY
Qty: 90 TABLET | Refills: 0 | Status: SHIPPED | OUTPATIENT
Start: 2020-02-10 | End: 2020-02-14

## 2020-02-10 NOTE — TELEPHONE ENCOUNTER
From: Jennifer Mccray  To: Yusuf Kngiht MD  Sent: 2/10/2020 8:25 AM CST  Subject: Prescription Question    Could you please make my refill for Simvastatin 10MG tablet be a 90 day refill. The insurance prefers this instead of 30 days.  I spoke to the [facility]

## 2020-02-10 NOTE — TELEPHONE ENCOUNTER
Refill of Simvastatin OK?     LOV 9/17/19  Last refill 1/20/2020  QTY 30  Refills 0  Last labs 9/17/19    Requests 90

## 2020-02-14 RX ORDER — SIMVASTATIN 10 MG
TABLET ORAL
Qty: 30 TABLET | Refills: 0 | Status: SHIPPED | OUTPATIENT
Start: 2020-02-14 | End: 2020-05-15

## 2020-04-19 RX ORDER — LISINOPRIL 20 MG/1
TABLET ORAL
Qty: 90 TABLET | Refills: 0 | Status: SHIPPED | OUTPATIENT
Start: 2020-04-19 | End: 2020-07-08

## 2020-05-15 RX ORDER — SIMVASTATIN 10 MG
TABLET ORAL
Qty: 90 TABLET | Refills: 0 | Status: SHIPPED | OUTPATIENT
Start: 2020-05-15 | End: 2020-07-30

## 2020-07-08 RX ORDER — LISINOPRIL 20 MG/1
20 TABLET ORAL DAILY
Qty: 90 TABLET | Refills: 0 | Status: SHIPPED | OUTPATIENT
Start: 2020-07-08 | End: 2020-09-29

## 2020-07-30 RX ORDER — SIMVASTATIN 10 MG
TABLET ORAL
Qty: 30 TABLET | Refills: 0 | Status: SHIPPED | OUTPATIENT
Start: 2020-07-30 | End: 2020-08-25

## 2020-08-25 RX ORDER — SIMVASTATIN 10 MG
TABLET ORAL
Qty: 30 TABLET | Refills: 0 | Status: SHIPPED | OUTPATIENT
Start: 2020-08-25 | End: 2020-09-24

## 2020-09-24 RX ORDER — SIMVASTATIN 10 MG
TABLET ORAL
Qty: 30 TABLET | Refills: 0 | Status: SHIPPED | OUTPATIENT
Start: 2020-09-24 | End: 2021-09-23

## 2020-09-24 NOTE — TELEPHONE ENCOUNTER
Rx Request  SIMVASTATIN 10 MG Oral Tab    Disp:    30                R: 0    Last Visit: 09/17/2019    Last Refilled: 08/25/2020    Protocol Passed?  Yes[  ]       No[ x ]
normal...

## 2020-09-29 RX ORDER — LISINOPRIL 20 MG/1
TABLET ORAL
Qty: 30 TABLET | Refills: 0 | Status: SHIPPED | OUTPATIENT
Start: 2020-09-29 | End: 2021-01-15

## 2020-09-29 NOTE — TELEPHONE ENCOUNTER
Call home number listed - person stated that it was a wrong number. Left message on his work number to call back to [facility] f/u appt.

## 2020-10-01 DIAGNOSIS — I10 BENIGN ESSENTIAL HTN: ICD-10-CM

## 2020-10-01 RX ORDER — METOPROLOL SUCCINATE 25 MG/1
TABLET, EXTENDED RELEASE ORAL
Qty: 90 TABLET | Refills: 0 | Status: SHIPPED | OUTPATIENT
Start: 2020-10-01 | End: 2020-12-30

## 2020-10-01 NOTE — TELEPHONE ENCOUNTER
Last ov 9/17/2019    Last refill 1/21/2020        . Pt is due for a physical. Please call to schedule one . He does not check [employer] message.  He will need to be seen for any additional refills

## 2020-10-05 NOTE — TELEPHONE ENCOUNTER
LM of work number as cell/home number is not correct.   Requested patient to call the office to schedule a physical.

## 2020-10-20 RX ORDER — SIMVASTATIN 10 MG
TABLET ORAL
Qty: 30 TABLET | Refills: 0 | OUTPATIENT
Start: 2020-10-20

## 2020-10-23 DIAGNOSIS — I10 BENIGN ESSENTIAL HTN: ICD-10-CM

## 2020-10-23 RX ORDER — METOPROLOL SUCCINATE 25 MG/1
TABLET, EXTENDED RELEASE ORAL
Qty: 90 TABLET | Refills: 0 | OUTPATIENT
Start: 2020-10-23

## 2020-10-23 RX ORDER — LISINOPRIL 20 MG/1
TABLET ORAL
Qty: 30 TABLET | Refills: 0 | OUTPATIENT
Start: 2020-10-23

## 2020-10-23 RX ORDER — SIMVASTATIN 10 MG
TABLET ORAL
Qty: 90 TABLET | Refills: 0 | OUTPATIENT
Start: 2020-10-23

## 2020-11-06 RX ORDER — LISINOPRIL 20 MG/1
TABLET ORAL
Qty: 30 TABLET | Refills: 0 | OUTPATIENT
Start: 2020-11-06

## 2020-11-28 RX ORDER — SIMVASTATIN 10 MG
TABLET ORAL
Qty: 30 TABLET | Refills: 0 | OUTPATIENT
Start: 2020-11-28

## 2020-12-30 DIAGNOSIS — I10 BENIGN ESSENTIAL HTN: ICD-10-CM

## 2020-12-30 RX ORDER — METOPROLOL SUCCINATE 25 MG/1
TABLET, EXTENDED RELEASE ORAL
Qty: 90 TABLET | Refills: 0 | Status: SHIPPED | OUTPATIENT
Start: 2020-12-30 | End: 2021-03-29

## 2021-01-02 RX ORDER — LISINOPRIL 20 MG/1
TABLET ORAL
Qty: 30 TABLET | Refills: 0 | OUTPATIENT
Start: 2021-01-02

## 2021-01-14 ENCOUNTER — TELEPHONE (OUTPATIENT)
Dept: FAMILY MEDICINE CLINIC | Facility: CLINIC | Age: 61
End: 2021-01-14

## 2021-01-14 NOTE — TELEPHONE ENCOUNTER
Called home/mobile number and a woman answered. Confirmed number, she said there is no one by the name Hannah Roque there. Called # listed as work # and no answer. Unable to LM. Called wife's number and spoke with her.  She said pt phone has changed and ga

## 2021-01-14 NOTE — TELEPHONE ENCOUNTER
Kelsey Miranda, LCSW 15 minutes ago (9:55 AM)        Pt left voicemail for LCSW, states he is out of medications and is not comfortable scheduling with Dr. Guzman Drilling until [place] pandemic is resolved.  Will forward to [facility] staff.         (do not have

## 2021-01-15 ENCOUNTER — TELEMEDICINE (OUTPATIENT)
Dept: FAMILY MEDICINE CLINIC | Facility: CLINIC | Age: 61
End: 2021-01-15
Payer: COMMERCIAL

## 2021-01-15 DIAGNOSIS — E11.9 CONTROLLED TYPE 2 DIABETES MELLITUS WITHOUT COMPLICATION, WITHOUT LONG-TERM CURRENT USE OF INSULIN (HCC): ICD-10-CM

## 2021-01-15 DIAGNOSIS — I10 BENIGN ESSENTIAL HTN: Primary | ICD-10-CM

## 2021-01-15 DIAGNOSIS — E78.1 HYPERTRIGLYCERIDEMIA: ICD-10-CM

## 2021-01-15 PROCEDURE — 99213 OFFICE O/P EST LOW 20 MIN: CPT | Performed by: FAMILY MEDICINE

## 2021-01-15 RX ORDER — LISINOPRIL 20 MG/1
20 TABLET ORAL DAILY
Qty: 90 TABLET | Refills: 0 | Status: SHIPPED | OUTPATIENT
Start: 2021-01-15 | End: 2021-04-12

## 2021-01-15 NOTE — TELEPHONE ENCOUNTER
Will discuss at visit, will need to arrange some kind of labs at least. In addition to discussion.     Lupe Blanton MD

## 2021-01-15 NOTE — PROGRESS NOTES
This visit is conducted using audio only    Patient has been referred to the [facility] website at [email or web address] to review the yearly Consent to Treat document.     Patient understands and accepts financial responsibility for any deductible, co-insuran visit. Labs:          Meds:   Current Outpatient Medications   Medication Sig Dispense Refill   • lisinopril 20 MG Oral Tab Take 1 tablet (20 mg total) by mouth daily.  90 tablet 0   • METOPROLOL SUCCINATE ER 25 MG Oral Tablet 24 Hr TAKE 1 TABLET BY

## 2021-03-29 DIAGNOSIS — I10 BENIGN ESSENTIAL HTN: ICD-10-CM

## 2021-03-29 RX ORDER — METOPROLOL SUCCINATE 25 MG/1
TABLET, EXTENDED RELEASE ORAL
Qty: 90 TABLET | Refills: 0 | Status: SHIPPED | OUTPATIENT
Start: 2021-03-29 | End: 2021-08-02

## 2021-03-29 NOTE — TELEPHONE ENCOUNTER
Rx Request  METOPROLOL SUCCINATE ER 25 MG Oral Tablet 24 Hr    Disp:      90              R: 0    Associated Dx: Benign essential HTN    Last Refilled: 12/30/2020    Last Visit: 01/15/2021    Protocol Passed?  Yes[  ]       No[ x ]

## 2021-04-11 DIAGNOSIS — I10 BENIGN ESSENTIAL HTN: ICD-10-CM

## 2021-04-12 RX ORDER — LISINOPRIL 20 MG/1
TABLET ORAL
Qty: 90 TABLET | Refills: 0 | Status: ON HOLD | OUTPATIENT
Start: 2021-04-12 | End: 2021-06-22

## 2021-05-18 ENCOUNTER — TELEPHONE (OUTPATIENT)
Dept: FAMILY MEDICINE CLINIC | Facility: CLINIC | Age: 61
End: 2021-05-18

## 2021-06-21 ENCOUNTER — HOSPITAL ENCOUNTER (INPATIENT)
Facility: HOSPITAL | Age: 61
LOS: 1 days | Discharge: HOME | DRG: 916 | End: 2021-06-22
Attending: EMERGENCY MEDICINE | Admitting: HOSPITALIST
Payer: COMMERCIAL

## 2021-06-21 DIAGNOSIS — T78.3XXA ANGIOEDEMA, INITIAL ENCOUNTER: Primary | ICD-10-CM

## 2021-06-21 RX ORDER — DIPHENHYDRAMINE HYDROCHLORIDE 50 MG/ML
25 INJECTION INTRAMUSCULAR; INTRAVENOUS ONCE
Status: DISCONTINUED | OUTPATIENT
Start: 2021-06-21 | End: 2021-06-21

## 2021-06-21 RX ORDER — METHYLPREDNISOLONE SODIUM SUCCINATE 125 MG/2ML
125 INJECTION, POWDER, LYOPHILIZED, FOR SOLUTION INTRAMUSCULAR; INTRAVENOUS ONCE
Status: DISCONTINUED | OUTPATIENT
Start: 2021-06-21 | End: 2021-06-22

## 2021-06-21 RX ORDER — DIPHENHYDRAMINE HYDROCHLORIDE 50 MG/ML
INJECTION INTRAMUSCULAR; INTRAVENOUS
Status: DISCONTINUED
Start: 2021-06-21 | End: 2021-06-21

## 2021-06-21 RX ORDER — FAMOTIDINE 10 MG/ML
20 INJECTION, SOLUTION INTRAVENOUS ONCE
Status: COMPLETED | OUTPATIENT
Start: 2021-06-21 | End: 2021-06-21

## 2021-06-21 RX ORDER — METHYLPREDNISOLONE SODIUM SUCCINATE 125 MG/2ML
INJECTION, POWDER, LYOPHILIZED, FOR SOLUTION INTRAMUSCULAR; INTRAVENOUS
Status: COMPLETED
Start: 2021-06-21 | End: 2021-06-21

## 2021-06-21 RX ORDER — FAMOTIDINE 10 MG/ML
INJECTION, SOLUTION INTRAVENOUS
Status: COMPLETED
Start: 2021-06-21 | End: 2021-06-21

## 2021-06-21 RX ORDER — DEXAMETHASONE SODIUM PHOSPHATE 10 MG/ML
10 INJECTION, SOLUTION INTRAMUSCULAR; INTRAVENOUS ONCE
Status: DISCONTINUED | OUTPATIENT
Start: 2021-06-21 | End: 2021-06-22

## 2021-06-22 VITALS
RESPIRATION RATE: 23 BRPM | BODY MASS INDEX: 29.96 KG/M2 | SYSTOLIC BLOOD PRESSURE: 150 MMHG | TEMPERATURE: 98 F | HEIGHT: 74 IN | OXYGEN SATURATION: 94 % | HEART RATE: 68 BPM | DIASTOLIC BLOOD PRESSURE: 88 MMHG | WEIGHT: 233.44 LBS

## 2021-06-22 PROBLEM — R73.9 HYPERGLYCEMIA: Status: ACTIVE | Noted: 2021-06-22

## 2021-06-22 PROBLEM — T78.3XXA ANGIOEDEMA, INITIAL ENCOUNTER: Status: ACTIVE | Noted: 2021-06-22

## 2021-06-22 PROBLEM — T78.3XXA ANGIO-EDEMA: Status: ACTIVE | Noted: 2021-06-22

## 2021-06-22 PROCEDURE — 3051F HG A1C>EQUAL 7.0%<8.0%: CPT | Performed by: FAMILY MEDICINE

## 2021-06-22 PROCEDURE — 99223 1ST HOSP IP/OBS HIGH 75: CPT | Performed by: HOSPITALIST

## 2021-06-22 RX ORDER — BISACODYL 10 MG
10 SUPPOSITORY, RECTAL RECTAL
Status: DISCONTINUED | OUTPATIENT
Start: 2021-06-22 | End: 2021-06-22

## 2021-06-22 RX ORDER — METOPROLOL SUCCINATE 25 MG/1
25 TABLET, EXTENDED RELEASE ORAL DAILY
Status: DISCONTINUED | OUTPATIENT
Start: 2021-06-22 | End: 2021-06-22

## 2021-06-22 RX ORDER — PROCHLORPERAZINE EDISYLATE 5 MG/ML
5 INJECTION INTRAMUSCULAR; INTRAVENOUS EVERY 8 HOURS PRN
Status: DISCONTINUED | OUTPATIENT
Start: 2021-06-22 | End: 2021-06-22

## 2021-06-22 RX ORDER — ACETAMINOPHEN 325 MG/1
650 TABLET ORAL EVERY 6 HOURS PRN
Status: DISCONTINUED | OUTPATIENT
Start: 2021-06-22 | End: 2021-06-22

## 2021-06-22 RX ORDER — AMLODIPINE BESYLATE 10 MG/1
10 TABLET ORAL DAILY
Qty: 60 TABLET | Refills: 0 | Status: SHIPPED | OUTPATIENT
Start: 2021-06-22 | End: 2021-08-23

## 2021-06-22 RX ORDER — AMLODIPINE BESYLATE 5 MG/1
10 TABLET ORAL DAILY
Status: DISCONTINUED | OUTPATIENT
Start: 2021-06-22 | End: 2021-06-22

## 2021-06-22 RX ORDER — DIPHENHYDRAMINE HYDROCHLORIDE 50 MG/ML
12.5 INJECTION INTRAMUSCULAR; INTRAVENOUS EVERY 4 HOURS PRN
Status: DISCONTINUED | OUTPATIENT
Start: 2021-06-22 | End: 2021-06-22

## 2021-06-22 RX ORDER — METHYLPREDNISOLONE SODIUM SUCCINATE 125 MG/2ML
60 INJECTION, POWDER, LYOPHILIZED, FOR SOLUTION INTRAMUSCULAR; INTRAVENOUS EVERY 12 HOURS
Status: DISCONTINUED | OUTPATIENT
Start: 2021-06-22 | End: 2021-06-22

## 2021-06-22 RX ORDER — DEXTROSE MONOHYDRATE 25 G/50ML
50 INJECTION, SOLUTION INTRAVENOUS
Status: DISCONTINUED | OUTPATIENT
Start: 2021-06-22 | End: 2021-06-22

## 2021-06-22 RX ORDER — ONDANSETRON 2 MG/ML
4 INJECTION INTRAMUSCULAR; INTRAVENOUS EVERY 6 HOURS PRN
Status: DISCONTINUED | OUTPATIENT
Start: 2021-06-22 | End: 2021-06-22

## 2021-06-22 RX ORDER — POLYETHYLENE GLYCOL 3350 17 G/17G
17 POWDER, FOR SOLUTION ORAL DAILY PRN
Status: DISCONTINUED | OUTPATIENT
Start: 2021-06-22 | End: 2021-06-22

## 2021-06-22 RX ORDER — FAMOTIDINE 20 MG/1
40 TABLET ORAL DAILY
Qty: 10 TABLET | Refills: 0 | Status: SHIPPED | OUTPATIENT
Start: 2021-06-22 | End: 2021-06-27

## 2021-06-22 RX ORDER — AMLODIPINE BESYLATE 10 MG/1
10 TABLET ORAL DAILY
Qty: 60 TABLET | Refills: 0 | Status: SHIPPED | OUTPATIENT
Start: 2021-06-22 | End: 2021-06-22

## 2021-06-22 RX ORDER — FAMOTIDINE 10 MG/ML
20 INJECTION, SOLUTION INTRAVENOUS 2 TIMES DAILY
Status: DISCONTINUED | OUTPATIENT
Start: 2021-06-22 | End: 2021-06-22

## 2021-06-22 RX ORDER — PREDNISONE 20 MG/1
40 TABLET ORAL DAILY
Qty: 10 TABLET | Refills: 0 | Status: SHIPPED | OUTPATIENT
Start: 2021-06-22 | End: 2021-06-27

## 2021-06-22 RX ORDER — ASPIRIN 81 MG/1
81 TABLET ORAL DAILY
Status: DISCONTINUED | OUTPATIENT
Start: 2021-06-22 | End: 2021-06-22

## 2021-06-22 RX ORDER — DIPHENHYDRAMINE HCL 25 MG
25 CAPSULE ORAL EVERY 4 HOURS PRN
Status: DISCONTINUED | OUTPATIENT
Start: 2021-06-22 | End: 2021-06-22

## 2021-06-22 RX ORDER — DOCUSATE SODIUM 100 MG/1
100 CAPSULE, LIQUID FILLED ORAL 2 TIMES DAILY
Status: DISCONTINUED | OUTPATIENT
Start: 2021-06-22 | End: 2021-06-22

## 2021-06-22 RX ORDER — PRAVASTATIN SODIUM 20 MG
20 TABLET ORAL NIGHTLY
Refills: 0 | Status: DISCONTINUED | OUTPATIENT
Start: 2021-06-22 | End: 2021-06-22

## 2021-06-22 RX ORDER — SODIUM PHOSPHATE, DIBASIC AND SODIUM PHOSPHATE, MONOBASIC 7; 19 G/133ML; G/133ML
1 ENEMA RECTAL ONCE AS NEEDED
Status: DISCONTINUED | OUTPATIENT
Start: 2021-06-22 | End: 2021-06-22

## 2021-06-22 NOTE — PROGRESS NOTES
Patient presented to the ED with tongue swelling with rapid progression posteriorly, difficulty swallowing, drooling and unable to talk. He woke his wife and had her bring him in emergently. No known exposures, new foods, or new medications.   Reports hav

## 2021-06-22 NOTE — ED INITIAL ASSESSMENT (HPI)
Arrives from home with angioedema of tongue. Left> right that started just PTA, pt on lisinopril at home. Difficulty speaking and swallowing.  States he feels like the swelling is moving down the back of hs throat

## 2021-06-22 NOTE — CONSULTS
Laureano Anderson [address] [facility] [address] Note [facility]  Report of Consultation    Elizabeth Pabon Patient Status:  Inpatient    1960 MRN [id number]   [facility] 4SW-A Attending Shahnaz Carvajal • OTHER  10/11    card cath post proc, no stents    • REPAIR CRUCIATE [place]  1/1/98    right x4   • TOTAL KNEE REPLACEMENT       Family History   Problem Relation Age of Onset   • Other (crohn's) Mother         2009, cancer,    • Other (lung dejan fatigue, orthopnea, paroxysmal nocturnal dyspnea, lower extremity edema. Gastrointestinal: Negative for odynophagia, reflux symptoms, nausea, vomiting, change in bowel habits, diarrhea, constipation and abdominal pain.  See HPI  Integument/breast: Negative focal deficits  HEENT: Atraumatic, normocephalic, EOMI, no icterus/hemorrhage, no conjunctival injection/discharge, nares normal  MOUTH: MMM, good dentition  NECK: Trachea midline, symmetric, no visible masses or scars, no crepitus, normal flexion/extensio status and airway; now improved and remains on RA  Continue prednisone and antihistamine - will plan to complete 5 day regimen  Advance diet  Follow fluid balance, lytes, urine output  Mobilize as able  Discussed with patient to avoid any ACEI regimen in t

## 2021-06-22 NOTE — PLAN OF CARE
NURSING DISCHARGE NOTE    Discharged home via wheelchair. Accompanied by wife  Belongings taken by patient. IV and tele discontinued. Discharge instructions, meds, follow up appt explained to patient and wife with understanding verbalized.  Rx given t

## 2021-06-22 NOTE — H&P
[facility]  History and Physical     Mary Hudson Patient Status:  Emergency    1960 MRN [id number]   Location [address] [facility] Attending Fe Land, [address] [place] [address] Day # 0 PCP Jeramie Vail MD     Chief Complaint: Age of Onset   • Other (crohn's) Mother         2009, cancer,    • Other (lung removed) Mother         2013   • Other (lung infection) Mother    • Other (alzheimer's dementia) Mother    • Other (drug dependence) Son    • Cancer Father         colon and andre mucous membranes. EOM-I. PERRLA. Anicteric. Neck: No lymphadenopathy. No JVD. No carotid bruits. Respiratory: Clear to auscultation bilaterally. No wheezes. No rhonchi. Cardiovascular: S1, S2. Regular rate and rhythm. No murmurs, rubs or gallops.  Equal SCDs  · CODE status: Full  · Perez: None  · If COVID testing is negative, may discontinue isolation: Yes    Plan of care discussed with patient at bedside.     Ayde Champion DO  6/22/2021

## 2021-06-22 NOTE — ED QUICK NOTES
Assumed care for this pt, alert awake, breathing easy & non labored. Able to vocalize better from previous per pt. Still with swelling to L tongue, les tightness to throat. Received with NS 1L infusing only.  To monitor

## 2021-06-22 NOTE — ED PROVIDER NOTES
Patient Seen in: [facility] Emergency Department      History   Patient presents with:   Allergic Rxn Allergies    Stated Complaint: swellng to tongue    HPI/Subjective:   HPI    70-year-old with a history of diabetes, hypertension, sleep apnea prese HPI.  Constitutional and vital signs reviewed. All other systems reviewed and negative except as noted above.     Physical Exam     ED Triage Vitals   BP 06/21/21 2311 150/84   Pulse 06/21/21 2311 71   Resp 06/21/21 2311 18   Temp 06/21/21 2355 98.3 °F -----------         ------                     CBC W/ DIFFERENTIAL[[id number]]          Abnormal            Final result                 Please view results for these tests on the individual orders.    SCAN SLIDE   PATH COMMENT CBC initial encounter  (primary encounter diagnosis)     Disposition:  Transfer to another facility  6/21/2021 11:12 pm    Follow-up:  No follow-up provider specified.         Medications Prescribed:  Current Discharge Medication List

## 2021-06-22 NOTE — PLAN OF CARE
Received patient from the ED. Alert. See flowsheet for further assessment. RA. SR. VSS.  CLD. Activity as tolerated.

## 2021-06-22 NOTE — Clinical Note
Date: 6/21/2021  Patient: Ann-Marie Kwon  Admitted: 6/21/2021 11:01 PM  Attending Provider: Erma Miramontes MD    Transfer to [facility] was arranged due to need for higher level of care.  Attending physician at the receiving facility was in agreement

## 2021-06-22 NOTE — ED PROVIDER NOTES
[facility] and [facility] to ICU. On arrival patient reports that his symptoms are significantly improved since the treatments from [facility].   He is still complaining of some swelling in his tongue but denies any

## 2021-06-22 NOTE — PAYOR COMM NOTE
--------------  DISCHARGE REVIEW    Payor: [employer]  Subscriber #:  [id number]  Authorization Number: [id number]    To ED 6/21/21  Admit date: 6/22/21  Admit time:   4:31 AM  Discharge Date: 6/22/2021  2:13 PM     Admitting Physician: Remberto Wahl,

## 2021-06-24 ENCOUNTER — PATIENT OUTREACH (OUTPATIENT)
Dept: CASE MANAGEMENT | Age: 61
End: 2021-06-24

## 2021-06-24 ENCOUNTER — OFFICE VISIT (OUTPATIENT)
Dept: FAMILY MEDICINE CLINIC | Facility: CLINIC | Age: 61
End: 2021-06-24
Payer: COMMERCIAL

## 2021-06-24 VITALS
OXYGEN SATURATION: 99 % | HEIGHT: 72.75 IN | RESPIRATION RATE: 16 BRPM | WEIGHT: 232 LBS | BODY MASS INDEX: 30.75 KG/M2 | HEART RATE: 83 BPM | SYSTOLIC BLOOD PRESSURE: 130 MMHG | DIASTOLIC BLOOD PRESSURE: 80 MMHG

## 2021-06-24 DIAGNOSIS — T78.3XXA ANGIOEDEMA DUE TO ANGIOTENSIN CONVERTING ENZYME INHIBITOR (ACE-I): Primary | ICD-10-CM

## 2021-06-24 DIAGNOSIS — E11.9 CONTROLLED TYPE 2 DIABETES MELLITUS WITHOUT COMPLICATION, WITHOUT LONG-TERM CURRENT USE OF INSULIN (HCC): ICD-10-CM

## 2021-06-24 DIAGNOSIS — I10 BENIGN ESSENTIAL HTN: ICD-10-CM

## 2021-06-24 DIAGNOSIS — T46.4X5A ANGIOEDEMA DUE TO ANGIOTENSIN CONVERTING ENZYME INHIBITOR (ACE-I): Primary | ICD-10-CM

## 2021-06-24 DIAGNOSIS — Z02.9 ENCOUNTERS FOR ADMINISTRATIVE PURPOSE: ICD-10-CM

## 2021-06-24 PROCEDURE — 3075F SYST BP GE 130 - 139MM HG: CPT | Performed by: FAMILY MEDICINE

## 2021-06-24 PROCEDURE — 3079F DIAST BP 80-89 MM HG: CPT | Performed by: FAMILY MEDICINE

## 2021-06-24 PROCEDURE — 3008F BODY MASS INDEX DOCD: CPT | Performed by: FAMILY MEDICINE

## 2021-06-24 PROCEDURE — 99214 OFFICE O/P EST MOD 30 MIN: CPT | Performed by: FAMILY MEDICINE

## 2021-06-24 NOTE — PROGRESS NOTES
Following up from hospital where he was seen for angioedema. Due to lisinopril. Off of lisinopril now. Taking prednisone. Amlodipine for blood pressure control. Started on Metformin 500 mg twice a day for hemoglobin A1c of 7.3.     Has been little down daily for 3 days then stop. 10 tablet 0   • famoTIDine (PEPCID) 20 MG Oral Tab Take 2 tablets (40 mg total) by mouth daily for 5 doses. 10 tablet 0   • amLODIPine Besylate 10 MG Oral Tab Take 1 tablet (10 mg total) by mouth daily.  60 tablet 0   • metFORMIN Lungs clear to auscultation no crackles or wheezes. ASSESSMENT/PLAN:    1. Angioedema due to angiotensin converting enzyme inhibitor (ACE-I)  Finish dose of prednisone and famotidine. If swelling comes back call us right away.   No future ACE inhibito

## 2021-08-02 DIAGNOSIS — I10 BENIGN ESSENTIAL HTN: ICD-10-CM

## 2021-08-02 RX ORDER — METOPROLOL SUCCINATE 25 MG/1
TABLET, EXTENDED RELEASE ORAL
Qty: 90 TABLET | Refills: 0 | Status: SHIPPED | OUTPATIENT
Start: 2021-08-02 | End: 2021-08-04

## 2021-08-04 DIAGNOSIS — I10 BENIGN ESSENTIAL HTN: ICD-10-CM

## 2021-08-04 RX ORDER — METOPROLOL SUCCINATE 25 MG/1
TABLET, EXTENDED RELEASE ORAL
Qty: 90 TABLET | Refills: 0 | Status: SHIPPED | OUTPATIENT
Start: 2021-08-04 | End: 2021-09-13

## 2021-08-23 RX ORDER — AMLODIPINE BESYLATE 10 MG/1
10 TABLET ORAL DAILY
Qty: 90 TABLET | Refills: 0 | Status: SHIPPED | OUTPATIENT
Start: 2021-08-23 | End: 2021-09-13 | Stop reason: SINTOL

## 2021-09-13 ENCOUNTER — OFFICE VISIT (OUTPATIENT)
Dept: FAMILY MEDICINE CLINIC | Facility: CLINIC | Age: 61
End: 2021-09-13
Payer: COMMERCIAL

## 2021-09-13 VITALS
HEART RATE: 78 BPM | DIASTOLIC BLOOD PRESSURE: 80 MMHG | OXYGEN SATURATION: 97 % | WEIGHT: 233 LBS | SYSTOLIC BLOOD PRESSURE: 162 MMHG | HEIGHT: 72 IN | BODY MASS INDEX: 31.56 KG/M2

## 2021-09-13 DIAGNOSIS — R60.0 BILATERAL LEG EDEMA: Primary | ICD-10-CM

## 2021-09-13 DIAGNOSIS — I10 BENIGN ESSENTIAL HTN: ICD-10-CM

## 2021-09-13 PROCEDURE — 3079F DIAST BP 80-89 MM HG: CPT | Performed by: FAMILY MEDICINE

## 2021-09-13 PROCEDURE — 3008F BODY MASS INDEX DOCD: CPT | Performed by: FAMILY MEDICINE

## 2021-09-13 PROCEDURE — 3077F SYST BP >= 140 MM HG: CPT | Performed by: FAMILY MEDICINE

## 2021-09-13 PROCEDURE — 99213 OFFICE O/P EST LOW 20 MIN: CPT | Performed by: FAMILY MEDICINE

## 2021-09-13 RX ORDER — METOPROLOL SUCCINATE 50 MG/1
50 TABLET, EXTENDED RELEASE ORAL DAILY
Qty: 90 TABLET | Refills: 3 | Status: SHIPPED | OUTPATIENT
Start: 2021-09-13

## 2021-09-13 NOTE — PROGRESS NOTES
Had a flight in the middle of July. Subsequently both legs swelled up. Not real painful. His brother and father suffered from blood clots. His mother at a late age suffered with congestive heart failure.   He denies headaches dizziness chest pain chest route daily.  100 each 0   • Glucose Blood (ONETOUCH VERIO) In Vitro Strip Test twice daily 100 each 2   • Blood Glucose Monitoring Suppl (ONETOUCH VERIO IQ SYSTEM) W/DEVICE Does not apply Kit Test twice daily 1 kit 0     ALLERGIES:   Ace Inhibitors, Amlodi show grade 1 diastolic dysfunction. Congestive heart failure would also be in the differential diagnosis. A repeat echocardiogram would be considered.     If this note is coded by time based on the Office/Outpatient Evaluation and Management Codes effecti

## 2021-09-16 ENCOUNTER — TELEPHONE (OUTPATIENT)
Dept: FAMILY MEDICINE CLINIC | Facility: CLINIC | Age: 61
End: 2021-09-16

## 2021-09-17 ENCOUNTER — TELEPHONE (OUTPATIENT)
Dept: FAMILY MEDICINE CLINIC | Facility: CLINIC | Age: 61
End: 2021-09-17

## 2021-09-20 NOTE — TELEPHONE ENCOUNTER
Last lipid: 9/17/19, spoke with patient he states he is getting his fasting labs done around the 23rd of September, paperwork will be completed after the labs are completed, patient agreed.

## 2021-09-23 ENCOUNTER — PATIENT MESSAGE (OUTPATIENT)
Dept: FAMILY MEDICINE CLINIC | Facility: CLINIC | Age: 61
End: 2021-09-23

## 2021-09-23 ENCOUNTER — LAB ENCOUNTER (OUTPATIENT)
Dept: LAB | Age: 61
End: 2021-09-23
Attending: FAMILY MEDICINE
Payer: COMMERCIAL

## 2021-09-23 DIAGNOSIS — E78.1 HYPERTRIGLYCERIDEMIA: ICD-10-CM

## 2021-09-23 DIAGNOSIS — E11.9 CONTROLLED TYPE 2 DIABETES MELLITUS WITHOUT COMPLICATION, WITHOUT LONG-TERM CURRENT USE OF INSULIN (HCC): ICD-10-CM

## 2021-09-23 DIAGNOSIS — I10 BENIGN ESSENTIAL HTN: ICD-10-CM

## 2021-09-23 LAB
ALBUMIN SERPL-MCNC: 3.9 G/DL (ref 3.4–5)
ALBUMIN/GLOB SERPL: 0.9 {RATIO} (ref 1–2)
ALP LIVER SERPL-CCNC: 93 U/L
ALT SERPL-CCNC: 65 U/L
ANION GAP SERPL CALC-SCNC: 6 MMOL/L (ref 0–18)
AST SERPL-CCNC: 57 U/L (ref 15–37)
BILIRUB SERPL-MCNC: 1.1 MG/DL (ref 0.1–2)
BUN BLD-MCNC: 12 MG/DL (ref 7–18)
CALCIUM BLD-MCNC: 9.6 MG/DL (ref 8.5–10.1)
CHLORIDE SERPL-SCNC: 106 MMOL/L (ref 98–112)
CHOLEST SERPL-MCNC: 213 MG/DL (ref ?–200)
CO2 SERPL-SCNC: 27 MMOL/L (ref 21–32)
CREAT BLD-MCNC: 1.07 MG/DL
CREAT UR-SCNC: 166 MG/DL
EST. AVERAGE GLUCOSE BLD GHB EST-MCNC: 140 MG/DL (ref 68–126)
GLOBULIN PLAS-MCNC: 4.4 G/DL (ref 2.8–4.4)
GLUCOSE BLD-MCNC: 134 MG/DL (ref 70–99)
HBA1C MFR BLD HPLC: 6.5 % (ref ?–5.7)
HDLC SERPL-MCNC: 70 MG/DL (ref 40–59)
LDLC SERPL CALC-MCNC: 124 MG/DL (ref ?–100)
MICROALBUMIN UR-MCNC: 2.99 MG/DL
MICROALBUMIN/CREAT 24H UR-RTO: 18 UG/MG (ref ?–30)
NONHDLC SERPL-MCNC: 143 MG/DL (ref ?–130)
OSMOLALITY SERPL CALC.SUM OF ELEC: 290 MOSM/KG (ref 275–295)
PATIENT FASTING Y/N/NP: YES
PATIENT FASTING Y/N/NP: YES
POTASSIUM SERPL-SCNC: 4.7 MMOL/L (ref 3.5–5.1)
PROT SERPL-MCNC: 8.3 G/DL (ref 6.4–8.2)
SODIUM SERPL-SCNC: 139 MMOL/L (ref 136–145)
TRIGL SERPL-MCNC: 107 MG/DL (ref 30–149)
VLDLC SERPL CALC-MCNC: 19 MG/DL (ref 0–30)

## 2021-09-23 PROCEDURE — 83036 HEMOGLOBIN GLYCOSYLATED A1C: CPT | Performed by: FAMILY MEDICINE

## 2021-09-23 PROCEDURE — 80061 LIPID PANEL: CPT | Performed by: FAMILY MEDICINE

## 2021-09-23 PROCEDURE — 82043 UR ALBUMIN QUANTITATIVE: CPT | Performed by: FAMILY MEDICINE

## 2021-09-23 PROCEDURE — 80053 COMPREHEN METABOLIC PANEL: CPT | Performed by: FAMILY MEDICINE

## 2021-09-23 PROCEDURE — 82570 ASSAY OF URINE CREATININE: CPT | Performed by: FAMILY MEDICINE

## 2021-09-23 RX ORDER — SIMVASTATIN 10 MG
10 TABLET ORAL NIGHTLY
Qty: 90 TABLET | Refills: 3 | Status: SHIPPED | OUTPATIENT
Start: 2021-09-23

## 2021-09-23 NOTE — TELEPHONE ENCOUNTER
Patient stopped at  and requested a refill of    SIMVASTATIN 10 MG Oral Tab    Please send to the [facility] on file     He was getting his labs and urine test done this morning

## 2021-09-23 NOTE — TELEPHONE ENCOUNTER
Pt had lipid drawn this am.   LOV 9/13/21  Last Rx 9/24/20 #30 + 0     Ok to refill for year? Do you want to wait until after lab comes back from today? Pended if agreeable.

## 2021-09-23 NOTE — TELEPHONE ENCOUNTER
From: Margaretmary Goldberg  To: Manuel Stone MD  Sent: 9/23/2021 3:02 PM CDT  Subject: Follow up to my visit with you last week concerning the swelling in my feet and lower legs    per your request this is an update regarding the swelling in my feet and lower le

## 2021-10-06 ENCOUNTER — PATIENT MESSAGE (OUTPATIENT)
Dept: FAMILY MEDICINE CLINIC | Facility: CLINIC | Age: 61
End: 2021-10-06

## 2021-10-06 NOTE — TELEPHONE ENCOUNTER
Last OV: 9/13/21  No future appt  Last A1C: (6.5) 9/23/21  Last refill: by Dr. Melyssa Davis hospitalist:  metFORMIN HCl 500 MG Oral Tab (Discontinued) 180 tablet 0 6/22/2021 6/22/2021   Sig:   Take 1 tablet (500 mg total) by mouth 2 (two) times daily with meals

## 2021-10-06 NOTE — TELEPHONE ENCOUNTER
From: Joyce Cooper  To: Kleber Shen MD  Sent: 10/6/2021 8:31 AM CDT  Subject: Stephanie Soto my Metformin prescription (500MG Tablets)    The prescription bottle says that Dr. Liriano Presume Required and I am out.

## 2022-09-13 DIAGNOSIS — I10 BENIGN ESSENTIAL HTN: ICD-10-CM

## 2022-09-14 RX ORDER — METOPROLOL SUCCINATE 50 MG/1
TABLET, EXTENDED RELEASE ORAL
Qty: 90 TABLET | Refills: 0 | Status: SHIPPED | OUTPATIENT
Start: 2022-09-14

## 2022-10-07 RX ORDER — SIMVASTATIN 10 MG
TABLET ORAL
Qty: 90 TABLET | Refills: 3 | OUTPATIENT
Start: 2022-10-07

## 2022-11-28 ENCOUNTER — OFFICE VISIT (OUTPATIENT)
Dept: FAMILY MEDICINE CLINIC | Facility: CLINIC | Age: 62
End: 2022-11-28
Payer: COMMERCIAL

## 2022-11-28 ENCOUNTER — LAB ENCOUNTER (OUTPATIENT)
Dept: LAB | Age: 62
End: 2022-11-28
Attending: FAMILY MEDICINE
Payer: COMMERCIAL

## 2022-11-28 VITALS
RESPIRATION RATE: 16 BRPM | HEART RATE: 68 BPM | BODY MASS INDEX: 29.99 KG/M2 | OXYGEN SATURATION: 99 % | DIASTOLIC BLOOD PRESSURE: 70 MMHG | SYSTOLIC BLOOD PRESSURE: 134 MMHG | HEIGHT: 72 IN | WEIGHT: 221.38 LBS

## 2022-11-28 DIAGNOSIS — E11.9 CONTROLLED TYPE 2 DIABETES MELLITUS WITHOUT COMPLICATION, WITHOUT LONG-TERM CURRENT USE OF INSULIN (HCC): ICD-10-CM

## 2022-11-28 DIAGNOSIS — I10 BENIGN ESSENTIAL HTN: ICD-10-CM

## 2022-11-28 DIAGNOSIS — E78.1 HYPERTRIGLYCERIDEMIA: ICD-10-CM

## 2022-11-28 DIAGNOSIS — Z00.00 ROUTINE GENERAL MEDICAL EXAMINATION AT A HEALTH CARE FACILITY: ICD-10-CM

## 2022-11-28 DIAGNOSIS — Z12.11 SCREEN FOR COLON CANCER: ICD-10-CM

## 2022-11-28 DIAGNOSIS — Z00.00 ROUTINE GENERAL MEDICAL EXAMINATION AT A HEALTH CARE FACILITY: Primary | ICD-10-CM

## 2022-11-28 PROBLEM — R60.0 BILATERAL LEG EDEMA: Status: RESOLVED | Noted: 2021-09-13 | Resolved: 2022-11-28

## 2022-11-28 PROBLEM — R73.9 HYPERGLYCEMIA: Status: RESOLVED | Noted: 2021-06-22 | Resolved: 2022-11-28

## 2022-11-28 LAB
ALBUMIN SERPL-MCNC: 3.5 G/DL (ref 3.4–5)
ALBUMIN/GLOB SERPL: 0.7 {RATIO} (ref 1–2)
ALP LIVER SERPL-CCNC: 115 U/L
ALT SERPL-CCNC: 63 U/L
ANION GAP SERPL CALC-SCNC: 7 MMOL/L (ref 0–18)
AST SERPL-CCNC: 67 U/L (ref 15–37)
BILIRUB SERPL-MCNC: 1.9 MG/DL (ref 0.1–2)
BUN BLD-MCNC: 17 MG/DL (ref 7–18)
CALCIUM BLD-MCNC: 9.4 MG/DL (ref 8.5–10.1)
CHLORIDE SERPL-SCNC: 101 MMOL/L (ref 98–112)
CHOLEST SERPL-MCNC: 204 MG/DL (ref ?–200)
CO2 SERPL-SCNC: 27 MMOL/L (ref 21–32)
COMPLEXED PSA SERPL-MCNC: 1.15 NG/ML (ref ?–4)
CREAT BLD-MCNC: 1.16 MG/DL
CREAT UR-SCNC: 402 MG/DL
EST. AVERAGE GLUCOSE BLD GHB EST-MCNC: 114 MG/DL (ref 68–126)
FASTING PATIENT LIPID ANSWER: YES
FASTING STATUS PATIENT QL REPORTED: YES
GFR SERPLBLD BASED ON 1.73 SQ M-ARVRAT: 71 ML/MIN/1.73M2 (ref 60–?)
GLOBULIN PLAS-MCNC: 4.7 G/DL (ref 2.8–4.4)
GLUCOSE BLD-MCNC: 111 MG/DL (ref 70–99)
HBA1C MFR BLD: 5.6 % (ref ?–5.7)
HDLC SERPL-MCNC: 44 MG/DL (ref 40–59)
LDLC SERPL CALC-MCNC: 136 MG/DL (ref ?–100)
MICROALBUMIN UR-MCNC: 3.06 MG/DL
MICROALBUMIN/CREAT 24H UR-RTO: 7.6 UG/MG (ref ?–30)
NONHDLC SERPL-MCNC: 160 MG/DL (ref ?–130)
OSMOLALITY SERPL CALC.SUM OF ELEC: 282 MOSM/KG (ref 275–295)
POTASSIUM SERPL-SCNC: 4.4 MMOL/L (ref 3.5–5.1)
PROT SERPL-MCNC: 8.2 G/DL (ref 6.4–8.2)
SODIUM SERPL-SCNC: 135 MMOL/L (ref 136–145)
TRIGL SERPL-MCNC: 133 MG/DL (ref 30–149)
TSI SER-ACNC: 1.15 MIU/ML (ref 0.36–3.74)
VLDLC SERPL CALC-MCNC: 24 MG/DL (ref 0–30)

## 2022-11-28 PROCEDURE — 3008F BODY MASS INDEX DOCD: CPT | Performed by: FAMILY MEDICINE

## 2022-11-28 PROCEDURE — 99396 PREV VISIT EST AGE 40-64: CPT | Performed by: FAMILY MEDICINE

## 2022-11-28 PROCEDURE — 80053 COMPREHEN METABOLIC PANEL: CPT | Performed by: FAMILY MEDICINE

## 2022-11-28 PROCEDURE — 83036 HEMOGLOBIN GLYCOSYLATED A1C: CPT | Performed by: FAMILY MEDICINE

## 2022-11-28 PROCEDURE — 3075F SYST BP GE 130 - 139MM HG: CPT | Performed by: FAMILY MEDICINE

## 2022-11-28 PROCEDURE — 84443 ASSAY THYROID STIM HORMONE: CPT | Performed by: FAMILY MEDICINE

## 2022-11-28 PROCEDURE — 82570 ASSAY OF URINE CREATININE: CPT | Performed by: FAMILY MEDICINE

## 2022-11-28 PROCEDURE — 82043 UR ALBUMIN QUANTITATIVE: CPT | Performed by: FAMILY MEDICINE

## 2022-11-28 PROCEDURE — 84153 ASSAY OF PSA TOTAL: CPT | Performed by: FAMILY MEDICINE

## 2022-11-28 PROCEDURE — 3078F DIAST BP <80 MM HG: CPT | Performed by: FAMILY MEDICINE

## 2022-11-28 PROCEDURE — 80061 LIPID PANEL: CPT | Performed by: FAMILY MEDICINE

## 2022-11-28 NOTE — PATIENT INSTRUCTIONS
Please go for your diabetic eye exam.  Last done July 2021. Seborrheic keratoses on the back. These of the raised rough lesions. Some benign flat moles. Also capillary hemangiomas. Keep a diet and symptom diary. Bring this to the GI doctor. Writing down everything you eat and when you have symptoms may help to find a pattern into your stool symptoms.

## 2023-01-26 PROBLEM — D12.4 BENIGN NEOPLASM OF DESCENDING COLON: Status: ACTIVE | Noted: 2023-01-26

## 2023-01-26 PROBLEM — Z12.11 SPECIAL SCREENING FOR MALIGNANT NEOPLASM OF COLON: Status: ACTIVE | Noted: 2023-01-26

## 2023-01-26 PROBLEM — D12.8 BENIGN NEOPLASM OF RECTUM: Status: ACTIVE | Noted: 2023-01-26

## 2023-02-02 RX ORDER — SIMVASTATIN 10 MG
10 TABLET ORAL NIGHTLY
Qty: 90 TABLET | Refills: 3 | Status: SHIPPED | OUTPATIENT
Start: 2023-02-02

## 2023-03-28 DIAGNOSIS — I10 BENIGN ESSENTIAL HTN: ICD-10-CM

## 2023-03-28 RX ORDER — METOPROLOL SUCCINATE 50 MG/1
TABLET, EXTENDED RELEASE ORAL
Qty: 90 TABLET | Refills: 0 | Status: SHIPPED | OUTPATIENT
Start: 2023-03-28

## 2023-04-20 RX ORDER — SIMVASTATIN 10 MG
10 TABLET ORAL NIGHTLY
Qty: 90 TABLET | Refills: 3 | Status: SHIPPED | OUTPATIENT
Start: 2023-04-20

## 2023-06-07 DIAGNOSIS — I10 BENIGN ESSENTIAL HTN: ICD-10-CM

## 2023-06-07 RX ORDER — METOPROLOL SUCCINATE 50 MG/1
50 TABLET, EXTENDED RELEASE ORAL DAILY
Qty: 90 TABLET | Refills: 0 | Status: SHIPPED | OUTPATIENT
Start: 2023-06-07

## 2023-09-06 DIAGNOSIS — I10 BENIGN ESSENTIAL HTN: ICD-10-CM

## 2023-09-06 RX ORDER — METOPROLOL SUCCINATE 50 MG/1
50 TABLET, EXTENDED RELEASE ORAL DAILY
Qty: 90 TABLET | Refills: 0 | Status: SHIPPED | OUTPATIENT
Start: 2023-09-06

## 2023-09-06 RX ORDER — SIMVASTATIN 10 MG
10 TABLET ORAL NIGHTLY
Qty: 90 TABLET | Refills: 0 | Status: SHIPPED | OUTPATIENT
Start: 2023-09-06

## 2023-11-29 DIAGNOSIS — I10 BENIGN ESSENTIAL HTN: ICD-10-CM

## 2023-11-29 RX ORDER — METOPROLOL SUCCINATE 50 MG/1
50 TABLET, EXTENDED RELEASE ORAL DAILY
Qty: 90 TABLET | Refills: 0 | Status: SHIPPED | OUTPATIENT
Start: 2023-11-29

## 2023-11-29 NOTE — TELEPHONE ENCOUNTER
A refill request was received for:  Requested Prescriptions     Pending Prescriptions Disp Refills    METOPROLOL SUCCINATE ER 50 MG Oral Tablet 24 Hr [Pharmacy Med Name: METOPROLOL ER SUCCINATE 50MG TABS] 90 tablet 0     Sig: TAKE 1 TABLET(50 MG) BY MOUTH DAILY       Last refill date: 9/6/2023      Last office visit: 11/28/2022    Follow up due:  No future appointments.

## 2023-12-04 RX ORDER — SIMVASTATIN 10 MG
10 TABLET ORAL NIGHTLY
Qty: 30 TABLET | Refills: 0 | Status: SHIPPED | OUTPATIENT
Start: 2023-12-04

## 2023-12-04 RX ORDER — SIMVASTATIN 10 MG
10 TABLET ORAL NIGHTLY
Qty: 90 TABLET | Refills: 0 | OUTPATIENT
Start: 2023-12-04

## 2023-12-04 NOTE — TELEPHONE ENCOUNTER
A refill request was received for:  Requested Prescriptions     Pending Prescriptions Disp Refills    SIMVASTATIN 10 MG Oral Tab [Pharmacy Med Name: SIMVASTATIN 10MG TABLETS] 90 tablet 0     Sig: TAKE 1 TABLET(10 MG) BY MOUTH EVERY NIGHT       Last refill date: 9/6/2023    My chart sent-overdue for appt and labs-#30? Last office visit:11/28/2022     Follow up due:  No future appointments.

## 2024-02-03 ENCOUNTER — PATIENT MESSAGE (OUTPATIENT)
Dept: FAMILY MEDICINE CLINIC | Facility: CLINIC | Age: 64
End: 2024-02-03

## 2024-02-03 DIAGNOSIS — L02.91 ABSCESS: Primary | ICD-10-CM

## 2024-02-03 RX ORDER — SULFAMETHOXAZOLE AND TRIMETHOPRIM 800; 160 MG/1; MG/1
1 TABLET ORAL 2 TIMES DAILY
Qty: 20 TABLET | Refills: 0 | Status: SHIPPED | OUTPATIENT
Start: 2024-02-03

## 2024-02-03 NOTE — TELEPHONE ENCOUNTER
Returning his page. See pt message.    Started Tuesday.  Getting bigger and more painful.  + discharge pus and blood. No F/C.  Sending bactrim.  Having him come on Monday.

## 2024-02-05 ENCOUNTER — OFFICE VISIT (OUTPATIENT)
Dept: FAMILY MEDICINE CLINIC | Facility: CLINIC | Age: 64
End: 2024-02-05
Payer: COMMERCIAL

## 2024-02-05 VITALS
HEIGHT: 72 IN | HEART RATE: 77 BPM | OXYGEN SATURATION: 98 % | DIASTOLIC BLOOD PRESSURE: 82 MMHG | BODY MASS INDEX: 28.31 KG/M2 | WEIGHT: 209 LBS | SYSTOLIC BLOOD PRESSURE: 136 MMHG

## 2024-02-05 DIAGNOSIS — L02.91 ABSCESS: Primary | ICD-10-CM

## 2024-02-05 PROCEDURE — 99213 OFFICE O/P EST LOW 20 MIN: CPT | Performed by: FAMILY MEDICINE

## 2024-02-05 PROCEDURE — 3075F SYST BP GE 130 - 139MM HG: CPT | Performed by: FAMILY MEDICINE

## 2024-02-05 PROCEDURE — 3008F BODY MASS INDEX DOCD: CPT | Performed by: FAMILY MEDICINE

## 2024-02-05 PROCEDURE — 3079F DIAST BP 80-89 MM HG: CPT | Performed by: FAMILY MEDICINE

## 2024-02-05 NOTE — PROGRESS NOTES
Subjective:   Patient ID: Brody Bowden is a 63 year old male.    Infection swelling behind the testicles.  + pain.  No fever. Started draining a few days ago.  More today and pain improving.  Started bactrim from page over the weekend.  3rd time this has happened to him here.        History/Other:   Review of Systems   All other systems reviewed and are negative.    Current Outpatient Medications   Medication Sig Dispense Refill    sulfamethoxazole-trimethoprim -160 MG Oral Tab per tablet Take 1 tablet by mouth 2 (two) times daily. 20 tablet 0    simvastatin 10 MG Oral Tab Take 1 tablet (10 mg total) by mouth nightly. 30 tablet 0    metoprolol succinate ER 50 MG Oral Tablet 24 Hr Take 1 tablet (50 mg total) by mouth daily. 90 tablet 0    metFORMIN 500 MG Oral Tab Take 1 tablet (500 mg total) by mouth 2 (two) times daily with meals. 180 tablet 3    aspirin 81 MG Oral Tab Take 1 tablet (81 mg total) by mouth daily.       Allergies:  Allergies   Allergen Reactions    Ace Inhibitors ANGIOEDEMA    Amlodipine SWELLING     Ankle edema, hard stools    Naproxen ANAPHYLAXIS     Passed out       Objective:   Physical Exam  Vitals reviewed.   Constitutional:       General: He is not in acute distress.     Appearance: He is well-developed. He is not diaphoretic.   Eyes:      General: No scleral icterus.        Right eye: No discharge.         Left eye: No discharge.      Conjunctiva/sclera: Conjunctivae normal.   Cardiovascular:      Rate and Rhythm: Normal rate and regular rhythm.      Heart sounds: Normal heart sounds.   Pulmonary:      Effort: Pulmonary effort is normal. No respiratory distress.      Breath sounds: Normal breath sounds. No wheezing or rales.   Skin:     Comments: Soft abscess behing testicles.  Bleeding.  Small drainage hole.  No pus draining currently.  Some induration also.  About 1 inch x 0.5 inch.       Assessment & Plan:   1. Abscess        Improving.  Continue bactrim.  Warm compresses.  To ED if  fever.  Follow up in 5 days.    Meds This Visit:  Requested Prescriptions      No prescriptions requested or ordered in this encounter       Imaging & Referrals:  None

## 2024-02-19 DIAGNOSIS — I10 BENIGN ESSENTIAL HTN: ICD-10-CM

## 2024-02-20 NOTE — TELEPHONE ENCOUNTER
A refill request was received for:  Requested Prescriptions     Pending Prescriptions Disp Refills    METOPROLOL SUCCINATE ER 50 MG Oral Tablet 24 Hr [Pharmacy Med Name: METOPROLOL ER SUCCINATE 50MG TABS] 90 tablet 0     Sig: TAKE 1 TABLET(50 MG) BY MOUTH DAILY       Last refill date:11/29/2023       Last office visit: 2/5/2024 for abscess     Follow up due:  No future appointments.

## 2024-02-21 DIAGNOSIS — I10 BENIGN ESSENTIAL HTN: ICD-10-CM

## 2024-02-21 RX ORDER — METOPROLOL SUCCINATE 50 MG/1
50 TABLET, EXTENDED RELEASE ORAL DAILY
Qty: 30 TABLET | Refills: 0 | Status: SHIPPED | OUTPATIENT
Start: 2024-02-21 | End: 2024-02-21

## 2024-02-21 RX ORDER — METOPROLOL SUCCINATE 50 MG/1
50 TABLET, EXTENDED RELEASE ORAL DAILY
Qty: 90 TABLET | Refills: 0 | Status: SHIPPED | OUTPATIENT
Start: 2024-02-21

## 2024-05-07 DIAGNOSIS — E11.9 CONTROLLED TYPE 2 DIABETES MELLITUS WITHOUT COMPLICATION, WITHOUT LONG-TERM CURRENT USE OF INSULIN (HCC): ICD-10-CM

## 2024-05-07 DIAGNOSIS — Z00.00 ROUTINE GENERAL MEDICAL EXAMINATION AT A HEALTH CARE FACILITY: ICD-10-CM

## 2024-05-08 NOTE — TELEPHONE ENCOUNTER
A refill request was received for:  Requested Prescriptions     Pending Prescriptions Disp Refills    METFORMIN 500 MG Oral Tab [Pharmacy Med Name: METFORMIN 500MG TABLETS] 180 tablet 3     Sig: TAKE 1 TABLET(500 MG) BY MOUTH TWICE DAILY WITH MEALS     Messaged pt on MyChart to schedule an examination.    Last refill date:   11/28/2022    Last office visit:  11/28/2022

## 2024-05-11 ENCOUNTER — PATIENT MESSAGE (OUTPATIENT)
Dept: FAMILY MEDICINE CLINIC | Facility: CLINIC | Age: 64
End: 2024-05-11

## 2024-05-11 DIAGNOSIS — N52.8 OTHER MALE ERECTILE DYSFUNCTION: ICD-10-CM

## 2024-05-13 RX ORDER — SILDENAFIL 50 MG/1
50 TABLET, FILM COATED ORAL
Qty: 8 TABLET | Refills: 12 | Status: SHIPPED | OUTPATIENT
Start: 2024-05-13

## 2024-05-13 NOTE — TELEPHONE ENCOUNTER
From: Brody Bowden  To: Tony Laughlin  Sent: 5/11/2024 1:27 PM CDT  Subject: Erectile Disfunction    Dr. Laughlin,    You have prescribed me Sildenafil 50 MG tablets in the past and I am currently out of them. Would you please refil my prescription.    Thank you,  Brody Bowden

## 2024-05-17 DIAGNOSIS — I10 BENIGN ESSENTIAL HTN: ICD-10-CM

## 2024-05-18 NOTE — TELEPHONE ENCOUNTER
.A refill request was received for:  Requested Prescriptions     Pending Prescriptions Disp Refills    METOPROLOL SUCCINATE ER 50 MG Oral Tablet 24 Hr [Pharmacy Med Name: METOPROLOL ER SUCCINATE 50MG TABS] 90 tablet 0     Sig: TAKE 1 TABLET(50 MG) BY MOUTH DAILY       Last refill date:   2/21/2023    Last office visit: 2/5/2024    Follow up due:  No future appointments.

## 2024-05-19 RX ORDER — METOPROLOL SUCCINATE 50 MG/1
50 TABLET, EXTENDED RELEASE ORAL DAILY
Qty: 90 TABLET | Refills: 0 | Status: SHIPPED | OUTPATIENT
Start: 2024-05-19

## 2024-08-14 DIAGNOSIS — I10 BENIGN ESSENTIAL HTN: ICD-10-CM

## 2024-08-14 RX ORDER — METOPROLOL SUCCINATE 50 MG/1
50 TABLET, EXTENDED RELEASE ORAL DAILY
Qty: 90 TABLET | Refills: 0 | Status: SHIPPED | OUTPATIENT
Start: 2024-08-14

## 2024-08-14 NOTE — TELEPHONE ENCOUNTER
A refill request was received for:  Requested Prescriptions     Pending Prescriptions Disp Refills    METOPROLOL SUCCINATE ER 50 MG Oral Tablet 24 Hr [Pharmacy Med Name: METOPROLOL ER SUCCINATE 50MG TABS] 90 tablet 0     Sig: TAKE 1 TABLET(50 MG) BY MOUTH DAILY       Last refill date:5/19/2024       Last office visit:2/5/2024     Follow up due:  No future appointments.

## 2024-10-01 ENCOUNTER — OFFICE VISIT (OUTPATIENT)
Dept: FAMILY MEDICINE CLINIC | Facility: CLINIC | Age: 64
End: 2024-10-01
Payer: MEDICARE

## 2024-10-01 ENCOUNTER — LAB ENCOUNTER (OUTPATIENT)
Dept: LAB | Age: 64
End: 2024-10-01
Attending: FAMILY MEDICINE
Payer: MEDICARE

## 2024-10-01 VITALS
SYSTOLIC BLOOD PRESSURE: 128 MMHG | HEART RATE: 74 BPM | HEIGHT: 72 IN | DIASTOLIC BLOOD PRESSURE: 78 MMHG | BODY MASS INDEX: 29.53 KG/M2 | WEIGHT: 218 LBS | OXYGEN SATURATION: 97 %

## 2024-10-01 DIAGNOSIS — Z00.00 ENCOUNTER FOR ANNUAL HEALTH EXAMINATION: Primary | ICD-10-CM

## 2024-10-01 DIAGNOSIS — F41.9 ANXIETY AND DEPRESSION: ICD-10-CM

## 2024-10-01 DIAGNOSIS — Z12.5 ENCOUNTER FOR SCREENING FOR MALIGNANT NEOPLASM OF PROSTATE: ICD-10-CM

## 2024-10-01 DIAGNOSIS — R19.7 DIARRHEA, UNSPECIFIED TYPE: ICD-10-CM

## 2024-10-01 DIAGNOSIS — E78.00 PURE HYPERCHOLESTEROLEMIA: ICD-10-CM

## 2024-10-01 DIAGNOSIS — E55.9 VITAMIN D DEFICIENCY: ICD-10-CM

## 2024-10-01 DIAGNOSIS — F10.11 ALCOHOL ABUSE, IN REMISSION: ICD-10-CM

## 2024-10-01 DIAGNOSIS — Z00.00 ENCOUNTER FOR ANNUAL HEALTH EXAMINATION: ICD-10-CM

## 2024-10-01 DIAGNOSIS — L30.9 ECZEMA, UNSPECIFIED TYPE: ICD-10-CM

## 2024-10-01 DIAGNOSIS — E11.9 TYPE 2 DIABETES MELLITUS WITHOUT COMPLICATION, WITHOUT LONG-TERM CURRENT USE OF INSULIN (HCC): ICD-10-CM

## 2024-10-01 DIAGNOSIS — I10 PRIMARY HYPERTENSION: ICD-10-CM

## 2024-10-01 DIAGNOSIS — K70.31 ALCOHOLIC CIRRHOSIS OF LIVER WITH ASCITES (HCC): ICD-10-CM

## 2024-10-01 DIAGNOSIS — F10.10 ALCOHOL ABUSE: ICD-10-CM

## 2024-10-01 DIAGNOSIS — F32.A ANXIETY AND DEPRESSION: ICD-10-CM

## 2024-10-01 DIAGNOSIS — E53.8 B12 DEFICIENCY: ICD-10-CM

## 2024-10-01 LAB
ALBUMIN SERPL-MCNC: 3.4 G/DL (ref 3.2–4.8)
ALBUMIN/GLOB SERPL: 0.7 {RATIO} (ref 1–2)
ALP LIVER SERPL-CCNC: 123 U/L
ALT SERPL-CCNC: 47 U/L
ANION GAP SERPL CALC-SCNC: 7 MMOL/L (ref 0–18)
AST SERPL-CCNC: 107 U/L (ref ?–34)
BASOPHILS # BLD AUTO: 0.05 X10(3) UL (ref 0–0.2)
BASOPHILS NFR BLD AUTO: 0.7 %
BILIRUB SERPL-MCNC: 6.2 MG/DL (ref 0.2–1.1)
BILIRUB UR QL CFM: POSITIVE
BUN BLD-MCNC: 14 MG/DL (ref 9–23)
CALCIUM BLD-MCNC: 9.5 MG/DL (ref 8.7–10.4)
CHLORIDE SERPL-SCNC: 103 MMOL/L (ref 98–112)
CHOLEST SERPL-MCNC: 193 MG/DL (ref ?–200)
CLARITY UR REFRACT.AUTO: CLEAR
CO2 SERPL-SCNC: 25 MMOL/L (ref 21–32)
COMPLEXED PSA SERPL-MCNC: 0.22 NG/ML (ref ?–4)
CREAT BLD-MCNC: 1.02 MG/DL
DEPRECATED HBV CORE AB SER IA-ACNC: 564 NG/ML
EGFRCR SERPLBLD CKD-EPI 2021: 82 ML/MIN/1.73M2 (ref 60–?)
EOSINOPHIL # BLD AUTO: 0.07 X10(3) UL (ref 0–0.7)
EOSINOPHIL NFR BLD AUTO: 1 %
ERYTHROCYTE [DISTWIDTH] IN BLOOD BY AUTOMATED COUNT: 13.8 %
ETHANOL SERPL-MCNC: <3 MG/DL (ref ?–3)
FASTING PATIENT LIPID ANSWER: YES
FASTING STATUS PATIENT QL REPORTED: YES
GLOBULIN PLAS-MCNC: 5.2 G/DL (ref 2–3.5)
GLUCOSE BLD-MCNC: 96 MG/DL (ref 70–99)
HCT VFR BLD AUTO: 40.2 %
HDLC SERPL-MCNC: 22 MG/DL (ref 40–59)
HGB BLD-MCNC: 13.6 G/DL
HYALINE CASTS #/AREA URNS AUTO: PRESENT /LPF
IMM GRANULOCYTES # BLD AUTO: 0.03 X10(3) UL (ref 0–1)
IMM GRANULOCYTES NFR BLD: 0.4 %
LDLC SERPL CALC-MCNC: 143 MG/DL (ref ?–100)
LYMPHOCYTES # BLD AUTO: 1.51 X10(3) UL (ref 1–4)
LYMPHOCYTES NFR BLD AUTO: 20.9 %
MCH RBC QN AUTO: 36.3 PG (ref 26–34)
MCHC RBC AUTO-ENTMCNC: 33.8 G/DL (ref 31–37)
MCV RBC AUTO: 107.2 FL
MONOCYTES # BLD AUTO: 0.92 X10(3) UL (ref 0.1–1)
MONOCYTES NFR BLD AUTO: 12.7 %
NEUTROPHILS # BLD AUTO: 4.65 X10 (3) UL (ref 1.5–7.7)
NEUTROPHILS # BLD AUTO: 4.65 X10(3) UL (ref 1.5–7.7)
NEUTROPHILS NFR BLD AUTO: 64.3 %
NONHDLC SERPL-MCNC: 171 MG/DL (ref ?–130)
OSMOLALITY SERPL CALC.SUM OF ELEC: 280 MOSM/KG (ref 275–295)
PLATELET # BLD AUTO: 199 10(3)UL (ref 150–450)
POTASSIUM SERPL-SCNC: 4.1 MMOL/L (ref 3.5–5.1)
PROT SERPL-MCNC: 8.6 G/DL (ref 5.7–8.2)
RBC # BLD AUTO: 3.75 X10(6)UL
SODIUM SERPL-SCNC: 135 MMOL/L (ref 136–145)
SP GR UR REFRACTOMETRY: 1.02 (ref 1–1.03)
TRIGL SERPL-MCNC: 152 MG/DL (ref 30–149)
TSI SER-ACNC: 1.78 MIU/ML (ref 0.55–4.78)
VIT B12 SERPL-MCNC: 725 PG/ML (ref 211–911)
VIT D+METAB SERPL-MCNC: 25.5 NG/ML (ref 30–100)
VLDLC SERPL CALC-MCNC: 28 MG/DL (ref 0–30)
WBC # BLD AUTO: 7.2 X10(3) UL (ref 4–11)

## 2024-10-01 PROCEDURE — 36415 COLL VENOUS BLD VENIPUNCTURE: CPT

## 2024-10-01 PROCEDURE — 99214 OFFICE O/P EST MOD 30 MIN: CPT | Performed by: FAMILY MEDICINE

## 2024-10-01 PROCEDURE — 82306 VITAMIN D 25 HYDROXY: CPT

## 2024-10-01 PROCEDURE — G0439 PPPS, SUBSEQ VISIT: HCPCS | Performed by: FAMILY MEDICINE

## 2024-10-01 PROCEDURE — 81001 URINALYSIS AUTO W/SCOPE: CPT

## 2024-10-01 PROCEDURE — 81015 MICROSCOPIC EXAM OF URINE: CPT

## 2024-10-01 PROCEDURE — 82728 ASSAY OF FERRITIN: CPT

## 2024-10-01 PROCEDURE — 84443 ASSAY THYROID STIM HORMONE: CPT

## 2024-10-01 PROCEDURE — 82607 VITAMIN B-12: CPT

## 2024-10-01 PROCEDURE — 82077 ASSAY SPEC XCP UR&BREATH IA: CPT

## 2024-10-01 PROCEDURE — 84425 ASSAY OF VITAMIN B-1: CPT

## 2024-10-01 PROCEDURE — 80053 COMPREHEN METABOLIC PANEL: CPT

## 2024-10-01 PROCEDURE — 85025 COMPLETE CBC W/AUTO DIFF WBC: CPT

## 2024-10-01 PROCEDURE — 80061 LIPID PANEL: CPT

## 2024-10-01 RX ORDER — SIMVASTATIN 40 MG
40 TABLET ORAL NIGHTLY
Qty: 90 TABLET | Refills: 3 | Status: SHIPPED | OUTPATIENT
Start: 2024-10-01

## 2024-10-01 RX ORDER — TRIAMCINOLONE ACETONIDE 1 MG/G
CREAM TOPICAL 2 TIMES DAILY PRN
Qty: 60 G | Refills: 3 | Status: SHIPPED | OUTPATIENT
Start: 2024-10-01

## 2024-10-01 RX ORDER — SIMVASTATIN 10 MG
10 TABLET ORAL NIGHTLY
Qty: 30 TABLET | Refills: 0 | Status: CANCELLED | OUTPATIENT
Start: 2024-10-01

## 2024-10-01 NOTE — PROGRESS NOTES
Subjective:   Brody Bowden is a 64 year old male who presents for a Medicare Initial Annual Wellness visit (Once after 12 month Medicare anniversary)  and scheduled follow up of multiple significant but stable problems.     2. Pure hypercholesterolemia. .  Chronic.  No CP/SOB.  No symptoms.  No side effects    3. Type 2 diabetes mellitus without complication, without long-term current use of insulin (HCC).  Chronic.  Last A1c value was 5.6% done 11/28/2022.  Hardly eating.  Not exercising.      HTN.  Chronic.  No CP/SOB.  No symptoms.  No side effects    5. Diarrhea, unspecified type.  Watery.  1-2 times a day.  On going for months. No black or bloody stools.  No abd pain.  But + abdominal distension and that feels uncomfortable.    6. Alcohol abuse. Intermittent.  At times drinking 1 bottle of wine in 1 day.    7. Alcohol abuse, in remission  No alcohol x 2 weeks.  Denied all withdrawal symptoms.    8. Anxiety and depression.  Chronic. Worsening.  Son recently was visiting and was blaming him for all his problems.      12. Alcoholic cirrhosis of liver with ascites (HCC).  With ascites and alchol hx    13. Eczema, unspecified type  On face.  Chronic. Red.  Itchy. Dry.  Flaking.    History/Other:   Fall Risk Assessment:   He has been screened for Falls and is low risk.      Cognitive Assessment:   He had a completely normal cognitive assessment - see flowsheet entries     Functional Ability/Status:   Brody Bowden has some abnormal functions as listed below:  He has Hearing problems based on screening of functional status.      Depression Screening (PHQ):  PHQ-2 SCORE: 2  , done 10/1/2024   Little interest or pleasure in doing things: 1    Feeling down, depressed, or hopeless: 1              Advanced Directives:   He has a Living Will on file in [facility]; reviewed and discussed documents with patient (and family/surrogate if present).  He has a Power of  for Health Care on file in [facility].  Discussed Advance Care  Planning with patient (and family/surrogate if present). Standard forms made available to patient in After Visit Summary.      Patient Active Problem List   Diagnosis    Psychosexual dysfunction with inhibited sexual excitement    Anomalous atrioventricular excitation    Benign essential HTN    Abnormal EKG    ZANE on CPAP    Hypertriglyceridemia    Controlled type 2 diabetes mellitus without complication, without long-term current use of insulin (HCC)    Special screening for malignant neoplasm of colon    Benign neoplasm of descending colon    Benign neoplasm of rectum     Allergies:  He is allergic to ace inhibitors, amlodipine, and naproxen.    Current Medications:  Outpatient Medications Marked as Taking for the 10/1/24 encounter (Office Visit) with Tiarra Lawson, DO   Medication Sig    simvastatin 40 MG Oral Tab Take 1 tablet (40 mg total) by mouth nightly.    sertraline 50 MG Oral Tab 1/2 tab daily x 6 days then 1 tab daily    triamcinolone 0.1 % External Cream Apply topically 2 (two) times daily as needed. Up to one week and one week break before restarting the cycle if needed       Medical History:  He  has a past medical history of Acquired absence of kidney, Bloating, Diarrhea, unspecified, High cholesterol, Osteoarthrosis, unspecified whether generalized or localized, lower leg        GLOBAL EXP 7-5-15 / Right Knee / [place] / [place] (06/24/2014), S/P total knee arthroplasty (04/06/2015), Sleep apnea, Status post total right knee replacement (04/29/2016), Type II or unspecified type diabetes mellitus without mention of complication, not stated as uncontrolled, Unspecified essential hypertension, Visual impairment, and Wears glasses.  Surgical History:  He  has a past surgical history that includes other (10/2011); repair cruciate ligament,knee (01/01/1998); Kidney Surgery; colonoscopy; knee surgery; total knee replacement; and knee replacement surgery.   Family History:  His family history includes Alcohol  and Other Disorders Associated in his mother; Cancer in his father and mother; Colon Cancer in his father; Colon Polyps in his sister; Crohn's Disease in his mother, niece, and sister; Prostate Cancer in his father; Prostate Cancer (age of onset: 61) in his brother; alzheimer's dementia in his mother; copperhead snake bite in his sister; crohn's in his mother; drug dependence in his son; lung infection in his mother; lung removed in his mother; lyme disease in his sister; pulmonary embolism in his brother and father; [place] n[place] virus in his sister.  Social History:  He  reports that he has never smoked. He has never used smokeless tobacco. He reports that he does not drink alcohol and does not use drugs.    Tobacco:  Social History     Tobacco Use   Smoking Status Never   Smokeless Tobacco Current    Types: Chew     E-Cigarettes/Vaping       Questions Responses    E-Cigarette Use Never User          CAGE Alcohol Screen:   CAGE screening score of 0 on 10/1/2024, showing low risk of alcohol abuse.      Patient Care Team:  Tiarra Lawson DO as PCP - General (Family Medicine)    Review of Systems   All other systems reviewed and are negative.         Objective:   Physical Exam  Vitals reviewed.   Constitutional:       General: He is not in acute distress.     Appearance: He is well-developed. He is not diaphoretic.   HENT:      Right Ear: External ear normal.      Left Ear: External ear normal.      Nose: Nose normal.      Mouth/Throat:      Pharynx: No oropharyngeal exudate.   Eyes:      General: No scleral icterus.        Right eye: No discharge.         Left eye: No discharge.      Conjunctiva/sclera: Conjunctivae normal.      Pupils: Pupils are equal, round, and reactive to light.   Neck:      Thyroid: No thyromegaly.   Cardiovascular:      Rate and Rhythm: Normal rate and regular rhythm.      Heart sounds: Normal heart sounds. No murmur heard.     No friction rub. No gallop.   Pulmonary:      Effort: Pulmonary  effort is normal. No respiratory distress.      Breath sounds: Normal breath sounds. No wheezing or rales.   Abdominal:      General: Bowel sounds are normal. There is distension.      Palpations: Abdomen is soft. There is no mass.      Tenderness: There is no abdominal tenderness. There is no guarding or rebound.   Genitourinary:     Penis: Normal. No tenderness.    Musculoskeletal:         General: No tenderness. Normal range of motion.      Cervical back: Normal range of motion and neck supple.   Lymphadenopathy:      Cervical: No cervical adenopathy.   Skin:     General: Skin is warm and dry.      Coloration: Skin is not pale.      Findings: No erythema or rash.   Neurological:      Mental Status: He is alert and oriented to person, place, and time.      Cranial Nerves: No cranial nerve deficit.      Motor: No abnormal muscle tone.      Coordination: Coordination normal.      Deep Tendon Reflexes: Reflexes are normal and symmetric.      Comments: No Asterixis. No tremors.  No redness of hands.   Psychiatric:         Behavior: Behavior normal.         Thought Content: Thought content normal.         Judgment: Judgment normal.          /78   Pulse 74   Ht 6' (1.829 m)   Wt 218 lb (98.9 kg)   SpO2 97%   BMI 29.57 kg/m²  Estimated body mass index is 29.57 kg/m² as calculated from the following:    Height as of this encounter: 6' (1.829 m).    Weight as of this encounter: 218 lb (98.9 kg).    Medicare Hearing Assessment:   Hearing Screening    Time taken: 10/1/2024  9:06 PM  Screening Method: Whisper Test  Whisper Test Result: Pass         Visual Acuity:   Right Eye Visual Acuity: Corrected Right Eye Chart Acuity: 20/20   Left Eye Visual Acuity: Corrected Left Eye Chart Acuity: 20/20   Both Eyes Visual Acuity: Corrected Both Eyes Chart Acuity: 20/20   Able To Tolerate Visual Acuity: Yes        Assessment & Plan:   Brody Bowden is a 64 year old male who presents for a Medicare Assessment.     1. Encounter  for annual health examination (Primary)  -     Lipid Panel; Future; Expected date: 10/01/2024  -     PSA Total, Screen; Future; Expected date: 10/01/2024  -     CBC With Differential With Platelet; Future; Expected date: 10/01/2024  -     Comp Metabolic Panel (14); Future; Expected date: 10/01/2024  -     TSH W Reflex To Free T4; Future; Expected date: 10/01/2024  -     Ferritin; Future; Expected date: 10/01/2024  -     Vitamin B1 (Thiamine), Blood; Future; Expected date: 10/01/2024  -     Vitamin B12; Future; Expected date: 10/01/2024  -     Vitamin D; Future; Expected date: 10/01/2024  2. Pure hypercholesterolemia  -     Simvastatin; Take 1 tablet (40 mg total) by mouth nightly.  Dispense: 90 tablet; Refill: 3  -     Urinalysis, Routine; Future; Expected date: 10/01/2024  3. Type 2 diabetes mellitus without complication, without long-term current use of insulin (HCC)  -     Urinalysis, Routine; Future; Expected date: 10/01/2024  4. Primary hypertension  -     Urinalysis, Routine; Future; Expected date: 10/01/2024  5. Diarrhea, unspecified type  -     Stool Culture W/Shigatoxin; Future; Expected date: 10/01/2024  -     CLOSTRIDIUM DIFFICILE CULTURE [93675]  -     Urinalysis, Routine; Future; Expected date: 10/01/2024  -     Occult Blood, Fecal, FIT Immunoassay; Future; Expected date: 10/01/2024  -     Ferritin; Future; Expected date: 10/01/2024  -     Vitamin B1 (Thiamine), Blood; Future; Expected date: 10/01/2024  -     Vitamin B12; Future; Expected date: 10/01/2024  -     Vitamin D; Future; Expected date: 10/01/2024  -     CT ABDOMEN+PELVIS(CONTRAST ONLY)(CPT=74177); Future; Expected date: 10/01/2024  6. Alcohol abuse  -     Occult Blood, Fecal, FIT Immunoassay; Future; Expected date: 10/01/2024  -     Ethyl Alcohol; Future; Expected date: 10/01/2024  -     Ferritin; Future; Expected date: 10/01/2024  -     Vitamin B1 (Thiamine), Blood; Future; Expected date: 10/01/2024  -     Vitamin B12; Future; Expected date:  10/01/2024  -     Vitamin D; Future; Expected date: 10/01/2024  7. Alcohol abuse, in remission  -     Ethyl Alcohol; Future; Expected date: 10/01/2024  -     Ferritin; Future; Expected date: 10/01/2024  -     Vitamin B1 (Thiamine), Blood; Future; Expected date: 10/01/2024  -     Vitamin B12; Future; Expected date: 10/01/2024  -     Vitamin D; Future; Expected date: 10/01/2024  8. Anxiety and depression  -     Sertraline HCl; 1/2 tab daily x 6 days then 1 tab daily  Dispense: 90 tablet; Refill: 3  9. B12 deficiency  -     Vitamin B12; Future; Expected date: 10/01/2024  10. Vitamin D deficiency  -     Vitamin D; Future; Expected date: 10/01/2024  11. Encounter for screening for malignant neoplasm of prostate  -     PSA Total, Screen; Future; Expected date: 10/01/2024  12. Alcoholic cirrhosis of liver with ascites (HCC)  -     Ferritin; Future; Expected date: 10/01/2024  -     Gastro Referral - In Network  -     CT ABDOMEN+PELVIS(CONTRAST ONLY)(CPT=74177); Future; Expected date: 10/01/2024  13. Eczema, unspecified type  -     Triamcinolone Acetonide; Apply topically 2 (two) times daily as needed. Up to one week and one week break before restarting the cycle if needed  Dispense: 60 g; Refill: 3  The patient indicates understanding of these issues and agrees to the plan.  Reinforced healthy diet, lifestyle, and exercise.      Return in about 2 weeks (around 10/15/2024).     Tiarra Lawson DO, 10/1/2024     Supplementary Documentation:   General Health:  In the past six months, have you lost more than 10 pounds without trying?: 2 - No  Has your appetite been poor?: Yes  Type of Diet: Balanced  How does the patient maintain a good energy level?: Other  How would you describe your daily physical activity?: Light  How would you describe your current health state?: Poor  How do you maintain positive mental well-being?: Visiting Family  On a scale of 0 to 10, with 0 being no pain and 10 being severe pain, what is your pain  level?: 4 - (Moderate)  In the past six months, have you experienced urine leakage?: 0-No  At any time do you feel concerned for the safety/well-being of yourself and/or your children, in your home or elsewhere?: No  Have you had any immunizations at another office such as Influenza, Hepatitis B, Tetanus, or Pneumococcal?: No    Health Maintenance   Topic Date Due    Pneumococcal Vaccine: 65+ Years (1 of 2 - PCV) Never done    Diabetes Care Foot Exam  09/17/2020    Diabetes Care Dilated Eye Exam  12/03/2021    Diabetes Care A1C  05/28/2023    Diabetes Care: GFR  11/28/2023    Diabetes Care: Microalb/Creat Ratio  11/28/2023    Annual Physical  11/28/2023    Tobacco Cessation Counseling  Never done    COVID-19 Vaccine (6 - 2023-24 season) 09/01/2024    Influenza Vaccine (1) 10/01/2024    PSA  11/28/2024    Colorectal Cancer Screening  01/26/2026    Annual Depression Screening  Completed    Fall Risk Screening (Annual)  Completed    Zoster Vaccines  Completed

## 2024-10-08 ENCOUNTER — OFFICE VISIT (OUTPATIENT)
Dept: FAMILY MEDICINE CLINIC | Facility: CLINIC | Age: 64
End: 2024-10-08
Payer: MEDICARE

## 2024-10-08 VITALS
OXYGEN SATURATION: 97 % | BODY MASS INDEX: 29.53 KG/M2 | HEIGHT: 72 IN | SYSTOLIC BLOOD PRESSURE: 128 MMHG | DIASTOLIC BLOOD PRESSURE: 78 MMHG | WEIGHT: 218 LBS | HEART RATE: 74 BPM

## 2024-10-08 DIAGNOSIS — D22.9 ATYPICAL MOLE: Primary | ICD-10-CM

## 2024-10-08 PROCEDURE — 11441 EXC FACE-MM B9+MARG 0.6-1 CM: CPT | Performed by: FAMILY MEDICINE

## 2024-10-08 PROCEDURE — 88305 TISSUE EXAM BY PATHOLOGIST: CPT | Performed by: FAMILY MEDICINE

## 2024-10-08 NOTE — PROGRESS NOTES
Subjective:   Patient ID: Brody Bowden is a 64 year old male.    Chronic mole on right forarm.  Getting painful and bigger.  No bleeding.        History/Other:   Review of Systems   All other systems reviewed and are negative.    Current Outpatient Medications   Medication Sig Dispense Refill    simvastatin 40 MG Oral Tab Take 1 tablet (40 mg total) by mouth nightly. 90 tablet 3    sertraline 50 MG Oral Tab 1/2 tab daily x 6 days then 1 tab daily 90 tablet 3    triamcinolone 0.1 % External Cream Apply topically 2 (two) times daily as needed. Up to one week and one week break before restarting the cycle if needed 60 g 3    METOPROLOL SUCCINATE ER 50 MG Oral Tablet 24 Hr TAKE 1 TABLET(50 MG) BY MOUTH DAILY 90 tablet 0    Sildenafil Citrate (VIAGRA) 50 MG Oral Tab Take 1 tablet (50 mg total) by mouth daily as needed for Erectile Dysfunction. 8 tablet 12    METFORMIN 500 MG Oral Tab TAKE 1 TABLET(500 MG) BY MOUTH TWICE DAILY WITH MEALS 180 tablet 3    simvastatin 10 MG Oral Tab Take 1 tablet (10 mg total) by mouth nightly. 30 tablet 0    aspirin 81 MG Oral Tab Take 1 tablet (81 mg total) by mouth daily.       Allergies:  Allergies   Allergen Reactions    Ace Inhibitors ANGIOEDEMA    Amlodipine SWELLING     Ankle edema, hard stools    Naproxen ANAPHYLAXIS     Passed out       Objective:   Physical Exam  Vitals reviewed.   Constitutional:       General: He is not in acute distress.     Appearance: He is well-developed. He is not diaphoretic.   Eyes:      General: No scleral icterus.        Right eye: No discharge.         Left eye: No discharge.      Conjunctiva/sclera: Conjunctivae normal.   Cardiovascular:      Rate and Rhythm: Normal rate and regular rhythm.      Heart sounds: Normal heart sounds.   Pulmonary:      Effort: Pulmonary effort is normal. No respiratory distress.      Breath sounds: Normal breath sounds. No wheezing or rales.   Skin:     Comments: Procedure explained.  Area cleaned with iodine and alcohol.   Injected with lidocaine with epi.  2 cc milliliters.  Lesion excised with 2 mm margins.  Mild bleeding.  2 simple interrupted sutures put in place.       Assessment & Plan:   1. Atypical mole      Procedure explained.  Area cleaned with iodine and alcohol.  Injected with lidocaine with epi.  2 cc milliliters.  Lesion excised with 2 mm margins.  Mild bleeding.  2 simple interrupted sutures put in placed.  Follow up in 10 days to have stitches removed.    Orders Placed This Encounter   Procedures    Specimen to Pathology, Tissue       Meds This Visit:  Requested Prescriptions      No prescriptions requested or ordered in this encounter       Imaging & Referrals:  None

## 2024-10-09 ENCOUNTER — HOSPITAL ENCOUNTER (OUTPATIENT)
Dept: CT IMAGING | Age: 64
Discharge: HOME | End: 2024-10-09
Attending: FAMILY MEDICINE
Payer: MEDICARE

## 2024-10-09 ENCOUNTER — TELEPHONE (OUTPATIENT)
Dept: CASE MANAGEMENT | Facility: HOSPITAL | Age: 64
End: 2024-10-09

## 2024-10-09 DIAGNOSIS — R19.7 DIARRHEA, UNSPECIFIED TYPE: ICD-10-CM

## 2024-10-09 DIAGNOSIS — K70.31 ALCOHOLIC CIRRHOSIS OF LIVER WITH ASCITES (HCC): ICD-10-CM

## 2024-10-09 LAB — VITAMIN B1 WHOLE BLD: 62.5 NMOL/L

## 2024-10-09 PROCEDURE — 74177 CT ABD & PELVIS W/CONTRAST: CPT | Performed by: FAMILY MEDICINE

## 2024-10-09 NOTE — CM/SW NOTE
Patient presented to outpatient registration for a direct admit from Dr. Tiarra Lawson.  No encounter noted in Epic.  Transfer center not aware of direct admission.  House supervisor not aware of direct admission.  Bubble chat sent to Dr. Lawson @ m.  [employer]/paged Dr. Lawson, [phone], at 5:15pm.  Updated patient and spouse that we had no information on the direct admission.  Per spouse, Dr. Lawson wanted patient to follow up with GI for a procedure, but first available appointment was in November.  Per patient and spouse, Dr. Lawson told patient to then come to the hospital for the procedure.  Explained that patient could check into the ER and be seen by a physician.    Unless emergent, paracentesis (patient thinks that is what he needs) is not normally performed in the ER, But explained that patient could be evaluated by a doctor to see if he needed to be admitted?  Patient could also call Dr. Lawson's office tonight or tomorrow and get the test done outpatient.  Patient does not want to check into the ER.  Patient will call Dr. Lawson tomorrow for further instructions.

## 2024-10-10 ENCOUNTER — TELEPHONE (OUTPATIENT)
Dept: FAMILY MEDICINE CLINIC | Facility: CLINIC | Age: 64
End: 2024-10-10

## 2024-10-10 PROBLEM — K70.31 ALCOHOLIC CIRRHOSIS OF LIVER WITH ASCITES (HCC): Status: ACTIVE | Noted: 2024-10-10

## 2024-10-10 PROBLEM — K76.9 LIVER LESION: Status: ACTIVE | Noted: 2024-10-10

## 2024-10-11 ENCOUNTER — APPOINTMENT (OUTPATIENT)
Dept: ULTRASOUND IMAGING | Facility: HOSPITAL | Age: 64
End: 2024-10-11
Attending: NURSE PRACTITIONER
Payer: MEDICARE

## 2024-10-11 PROBLEM — K70.31 ASCITES DUE TO ALCOHOLIC CIRRHOSIS (HCC): Status: ACTIVE | Noted: 2024-10-10

## 2024-10-11 PROBLEM — K72.90 DECOMPENSATED CIRRHOSIS (HCC): Status: ACTIVE | Noted: 2024-10-11

## 2024-10-11 PROBLEM — K74.60 DECOMPENSATED CIRRHOSIS (HCC): Status: ACTIVE | Noted: 2024-10-11

## 2024-10-11 PROCEDURE — 49083 ABD PARACENTESIS W/IMAGING: CPT | Performed by: NURSE PRACTITIONER

## 2024-10-12 ENCOUNTER — APPOINTMENT (OUTPATIENT)
Dept: MRI IMAGING | Facility: HOSPITAL | Age: 64
End: 2024-10-12
Attending: NURSE PRACTITIONER
Payer: MEDICARE

## 2024-10-12 PROCEDURE — 74183 MRI ABD W/O CNTR FLWD CNTR: CPT | Performed by: NURSE PRACTITIONER

## 2024-10-12 PROCEDURE — 76376 3D RENDER W/INTRP POSTPROCES: CPT | Performed by: NURSE PRACTITIONER

## 2024-10-15 ENCOUNTER — APPOINTMENT (OUTPATIENT)
Dept: MRI IMAGING | Facility: HOSPITAL | Age: 64
End: 2024-10-15
Attending: STUDENT IN AN ORGANIZED HEALTH CARE EDUCATION/TRAINING PROGRAM
Payer: MEDICARE

## 2024-10-15 ENCOUNTER — ANESTHESIA EVENT (OUTPATIENT)
Dept: ENDOSCOPY | Facility: HOSPITAL | Age: 64
End: 2024-10-15
Payer: MEDICARE

## 2024-10-15 ENCOUNTER — APPOINTMENT (OUTPATIENT)
Dept: ULTRASOUND IMAGING | Facility: HOSPITAL | Age: 64
End: 2024-10-15
Attending: STUDENT IN AN ORGANIZED HEALTH CARE EDUCATION/TRAINING PROGRAM
Payer: MEDICARE

## 2024-10-15 PROCEDURE — 74183 MRI ABD W/O CNTR FLWD CNTR: CPT | Performed by: STUDENT IN AN ORGANIZED HEALTH CARE EDUCATION/TRAINING PROGRAM

## 2024-10-15 PROCEDURE — 49083 ABD PARACENTESIS W/IMAGING: CPT | Performed by: STUDENT IN AN ORGANIZED HEALTH CARE EDUCATION/TRAINING PROGRAM

## 2024-10-15 NOTE — ANESTHESIA PREPROCEDURE EVALUATION
PRE-OP EVALUATION    Patient Name: Brody Bowden    Admit Diagnosis: Liver lesion [K76.9]  Alcoholic cirrhosis of liver with ascites (HCC) [K70.31]    Pre-op Diagnosis: inpatient    ESOPHAGOGASTRODUODENOSCOPY (EGD)    Anesthesia Procedure: ESOPHAGOGASTRODUODENOSCOPY (EGD)    Surgeons and Role:     * Frank Mckoy, DO - Primary    Pre-op vitals reviewed.  Temp: 97.7 °F (36.5 °C)  Pulse: 70  Resp: 16  BP: 110/58  SpO2: 99 %  There is no height or weight on file to calculate BMI.    Current medications reviewed.  Hospital Medications:  • furosemide (Lasix) tab 40 mg  40 mg Oral Daily   • spironolactone (Aldactone) tab 100 mg  100 mg Oral Daily   • [COMPLETED] magnesium oxide (Mag-Ox) tab 400 mg  400 mg Oral Once   • [COMPLETED] potassium chloride (Klor-Con M20) tab 40 mEq  40 mEq Oral Once   • rifAXIMin (Xifaxan) tab 550 mg  550 mg Oral BID   • alum-mag hydroxide-simethicone (Maalox) 200-200-20 MG/5ML oral suspension 30 mL  30 mL Oral QID PRN   • [COMPLETED] gadoterate meglumine (Dotarem) 10 MMOL/20ML injection 20 mL  20 mL Intravenous ONCE PRN   • [COMPLETED] magnesium oxide (Mag-Ox) tab 400 mg  400 mg Oral Once   • [COMPLETED] phytonadione (Aqua-Mephton) 5 mg in sodium chloride 0.9% 50 mL IVPB  5 mg Intravenous Daily   • lactulose (CHRONULAC) 10 GM/15ML solution 20 g  20 g Oral TID   • glucose (Dex4) 15 GM/59ML oral liquid 15 g  15 g Oral Q15 Min PRN    Or   • glucose (Glutose) 40% oral gel 15 g  15 g Oral Q15 Min PRN    Or   • glucose-vitamin C (Dex-4) chewable tab 4 tablet  4 tablet Oral Q15 Min PRN    Or   • dextrose 50% injection 50 mL  50 mL Intravenous Q15 Min PRN    Or   • glucose (Dex4) 15 GM/59ML oral liquid 30 g  30 g Oral Q15 Min PRN    Or   • glucose (Glutose) 40% oral gel 30 g  30 g Oral Q15 Min PRN    Or   • glucose-vitamin C (Dex-4) chewable tab 8 tablet  8 tablet Oral Q15 Min PRN   • insulin aspart (NovoLOG) 100 Units/mL FlexPen 1-10 Units  1-10 Units Subcutaneous TID AC and HS   • acetaminophen  (Tylenol Extra Strength) tab 500 mg  500 mg Oral Q4H PRN   • melatonin tab 3 mg  3 mg Oral Nightly PRN   • ondansetron (Zofran) 4 MG/2ML injection 4 mg  4 mg Intravenous Q6H PRN   • metoclopramide (Reglan) 5 mg/mL injection 5 mg  5 mg Intravenous Q8H PRN   • heparin (Porcine) 5000 UNIT/ML injection 5,000 Units  5,000 Units Subcutaneous 2 times per day   • polyethylene glycol (PEG 3350) (Miralax) 17 g oral packet 17 g  17 g Oral Daily PRN   • sennosides (Senokot) tab 17.2 mg  17.2 mg Oral Nightly PRN   • bisacodyl (Dulcolax) 10 MG rectal suppository 10 mg  10 mg Rectal Daily PRN   • fleet enema (Fleet) rectal enema 133 mL  1 enema Rectal Once PRN   • sertraline (Zoloft) tab 50 mg  50 mg Oral Daily   • influenza virus trivalent high dose PF (Fluzone HD) 0.5 mL injection (ages >/= 65 years) 0.5 mL  0.5 mL Intramuscular Prior to discharge       Outpatient Medications:   Prescriptions Prior to Admission[1]    Allergies: Ace inhibitors, Amlodipine, and Naproxen      Anesthesia Evaluation    Patient summary reviewed.    Anesthetic Complications           GI/Hepatic/Renal  Comment: cirrhosis              (+) liver disease                 Cardiovascular                  (+) hypertension                                     Endo/Other      (+) diabetes                            Pulmonary                    (+) sleep apnea and CPAP      Neuro/Psych                              S/p paracentisis 10/15      Past Surgical History:   Procedure Laterality Date   • Colonoscopy     • Kidney surgery      left nephrectomy, due to injury from football as a child   • Knee replacement surgery     • Knee surgery     • Other  10/2011    card cath post proc, no stents    • Repair cruciate ligament,knee  01/01/1998    right x4   • Total knee replacement       Social History     Socioeconomic History   • Marital status:    Tobacco Use   • Smoking status: Never   • Smokeless tobacco: Never   • Tobacco comments:     Chews tobacco daily    Vaping Use   • Vaping status: Never Used   Substance and Sexual Activity   • Alcohol use: Not Currently     Comment: wine 2-3 glasses/day   • Drug use: No   Other Topics Concern   • Caffeine Concern Yes   • Exercise Yes     History   Drug Use No     Available pre-op labs reviewed.  Lab Results   Component Value Date    WBC 8.1 10/15/2024    RBC 3.42 (L) 10/15/2024    HGB 12.4 (L) 10/15/2024    HCT 34.6 (L) 10/15/2024    .2 (H) 10/15/2024    MCH 36.3 (H) 10/15/2024    MCHC 35.8 10/15/2024    RDW 13.2 10/15/2024    .0 (L) 10/15/2024     Lab Results   Component Value Date     (L) 10/15/2024    K 3.7 10/15/2024     10/15/2024    CO2 25.0 10/15/2024    BUN 11 10/15/2024    CREATSERUM 0.94 10/15/2024     (H) 10/15/2024    CA 8.9 10/15/2024     Lab Results   Component Value Date    INR 1.46 (H) 10/15/2024         Airway      Mallampati: II  Mouth opening: >3 FB  TM distance: > 6 cm   Cardiovascular    Cardiovascular exam normal.         Dental             Pulmonary    Pulmonary exam normal.                 Other findings        ASA: 3   Plan: MAC  NPO status verified and         Comment: Chart review only  Plan/risks discussed with: patient              Present on Admission:  **None**             [1]   Medications Prior to Admission   Medication Sig Dispense Refill Last Dose/Taking   • sertraline 50 MG Oral Tab 1/2 tab daily x 6 days then 1 tab daily 90 tablet 3 10/9/2024 at  8:00 PM   • triamcinolone 0.1 % External Cream Apply topically 2 (two) times daily as needed. Up to one week and one week break before restarting the cycle if needed 60 g 3 10/9/2024 at 10:00 PM   • METOPROLOL SUCCINATE ER 50 MG Oral Tablet 24 Hr TAKE 1 TABLET(50 MG) BY MOUTH DAILY 90 tablet 0 10/10/2024 at  9:00 AM   • Sildenafil Citrate (VIAGRA) 50 MG Oral Tab Take 1 tablet (50 mg total) by mouth daily as needed for Erectile Dysfunction. 8 tablet 12 Past Month   • METFORMIN 500 MG Oral Tab TAKE 1 TABLET(500 MG) BY  MOUTH TWICE DAILY WITH MEALS 180 tablet 3 10/10/2024 at  9:00 AM   • simvastatin 40 MG Oral Tab Take 1 tablet (40 mg total) by mouth nightly. 90 tablet 3

## 2024-10-16 ENCOUNTER — ANESTHESIA (OUTPATIENT)
Dept: ENDOSCOPY | Facility: HOSPITAL | Age: 64
End: 2024-10-16
Payer: MEDICARE

## 2024-10-16 RX ORDER — LIDOCAINE HYDROCHLORIDE 10 MG/ML
INJECTION, SOLUTION EPIDURAL; INFILTRATION; INTRACAUDAL; PERINEURAL AS NEEDED
Status: DISCONTINUED | OUTPATIENT
Start: 2024-10-16 | End: 2024-10-16 | Stop reason: SURG

## 2024-10-16 RX ORDER — SODIUM CHLORIDE, SODIUM LACTATE, POTASSIUM CHLORIDE, CALCIUM CHLORIDE 600; 310; 30; 20 MG/100ML; MG/100ML; MG/100ML; MG/100ML
INJECTION, SOLUTION INTRAVENOUS CONTINUOUS PRN
Status: DISCONTINUED | OUTPATIENT
Start: 2024-10-16 | End: 2024-10-16 | Stop reason: SURG

## 2024-10-16 RX ADMIN — SODIUM CHLORIDE, SODIUM LACTATE, POTASSIUM CHLORIDE, CALCIUM CHLORIDE: 600; 310; 30; 20 INJECTION, SOLUTION INTRAVENOUS at 11:16:00

## 2024-10-16 RX ADMIN — LIDOCAINE HYDROCHLORIDE 5 ML: 10 INJECTION, SOLUTION EPIDURAL; INFILTRATION; INTRACAUDAL; PERINEURAL at 11:18:00

## 2024-10-16 RX ADMIN — SODIUM CHLORIDE, SODIUM LACTATE, POTASSIUM CHLORIDE, CALCIUM CHLORIDE: 600; 310; 30; 20 INJECTION, SOLUTION INTRAVENOUS at 11:37:00

## 2024-10-16 NOTE — ANESTHESIA POSTPROCEDURE EVALUATION
[facility]    Brody Bowden Patient Status:  Inpatient   Age/Gender 64 year old male MRN [id number]   Location [facility] ENDOSCOPY PAIN CENTER Attending Ned Lawson DO   Hosp Day # 6 PCP Tiarra Lawson DO       Anesthesia Post-op Note    ESOPHAGOGASTRODUODENOSCOPY (EGD) WITH BIOPSIES AND ESOPHAGEAL BANDING    Procedure Summary       Date: 10/16/24 Room / Location:  ENDOSCOPY 02 /  ENDOSCOPY    Anesthesia Start: 1116 Anesthesia Stop: 1143    Procedure: ESOPHAGOGASTRODUODENOSCOPY (EGD) WITH BIOPSIES AND ESOPHAGEAL BANDING Diagnosis: (PORTAL GASROPATHY,  NODULAR DUODENTITS, LARGE VARICES)    Surgeons: Frank Mckoy DO Anesthesiologist: Elham Petty MD    Anesthesia Type: MAC ASA Status: 3            Anesthesia Type: MAC    Vitals Value Taken Time   /73 10/16/24 1147   Temp  10/16/24 1150   Pulse 81 10/16/24 1150   Resp 19 10/16/24 1150   SpO2 98 % 10/16/24 1150   Vitals shown include unfiled device data.    Patient Location: Endoscopy    Anesthesia Type: MAC    Airway Patency: patent    Postop Pain Control: adequate    Mental Status: preanesthetic baseline    Nausea/Vomiting: none    Cardiopulmonary/Hydration status: stable euvolemic    Complications: no apparent anesthesia related complications    Postop vital signs: stable    Dental Exam: Unchanged from Preop    Patient to be discharged home when criteria met.

## 2024-10-18 ENCOUNTER — PATIENT OUTREACH (OUTPATIENT)
Dept: CASE MANAGEMENT | Age: 64
End: 2024-10-18

## 2024-10-22 ENCOUNTER — OFFICE VISIT (OUTPATIENT)
Dept: FAMILY MEDICINE CLINIC | Facility: CLINIC | Age: 64
End: 2024-10-22
Payer: MEDICARE

## 2024-10-22 DIAGNOSIS — K74.60 DECOMPENSATED CIRRHOSIS (HCC): ICD-10-CM

## 2024-10-22 DIAGNOSIS — Z48.02 VISIT FOR SUTURE REMOVAL: Primary | ICD-10-CM

## 2024-10-22 DIAGNOSIS — K70.31 ASCITES DUE TO ALCOHOLIC CIRRHOSIS (HCC): ICD-10-CM

## 2024-10-22 DIAGNOSIS — R53.1 GENERALIZED WEAKNESS: ICD-10-CM

## 2024-10-22 DIAGNOSIS — K70.30 ESOPHAGEAL VARICES IN ALCOHOLIC CIRRHOSIS (HCC): ICD-10-CM

## 2024-10-22 DIAGNOSIS — I85.10 ESOPHAGEAL VARICES IN ALCOHOLIC CIRRHOSIS (HCC): ICD-10-CM

## 2024-10-22 DIAGNOSIS — K72.90 DECOMPENSATED CIRRHOSIS (HCC): ICD-10-CM

## 2024-10-22 PROCEDURE — 99214 OFFICE O/P EST MOD 30 MIN: CPT | Performed by: PHYSICIAN ASSISTANT

## 2024-10-22 NOTE — PROGRESS NOTES
Subjective:   Patient ID: Brody Bowden is a 64 year old male.    HPI  Patient presents accompanied by his wife for follow up from recent admission  Was admitted for and diagnosed with decompensated cirrhosis secondary to alcohol use  Records reviewed in detail including labs, imaging, procedure results  He under went paracentesis x 2 for ascites  Had EGD showing 2 columns of larger esophageal varices s/p banding/decompression  MRI shows large hepatic hemangioma  He was started on spironolactone, protonix, lasix, lactulose, and rifaximin    He is here also for suture removal for small laceration of right forearm  3 sutures in place  No wound swelling, redness, pain, drainage    Since discharge he has continued to feel generally weak  No new symptoms  His abdomen remains distended but not like it was before admission  He has not had alcohol in the last 3 weeks.   States it is hard to eat, he fills up very quickly  When he does eat he will do ensure, protein shakes and is tolerating well    He is scheduled to follow up with GI next month    History/Other:   Review of Systems   Constitutional:  Positive for appetite change and fatigue. Negative for chills and fever.   HENT:  Negative for congestion, ear pain, rhinorrhea and sore throat.    Eyes:  Negative for visual disturbance.   Respiratory:  Negative for cough, shortness of breath and wheezing.    Cardiovascular:  Negative for chest pain, palpitations and leg swelling.   Gastrointestinal:  Positive for abdominal distention. Negative for abdominal pain, diarrhea, nausea and vomiting.   Genitourinary:  Negative for dysuria, frequency and hematuria.   Musculoskeletal:  Negative for arthralgias, gait problem and myalgias.   Skin:  Positive for wound (right forearm). Negative for rash.   Neurological:  Positive for weakness. Negative for light-headedness and headaches.   Hematological:  Negative for adenopathy.   Psychiatric/Behavioral:  Negative for dysphoric mood. The  patient is not nervous/anxious.      Current Outpatient Medications   Medication Sig Dispense Refill    furosemide 40 MG Oral Tab Take 1 tablet (40 mg total) by mouth daily. 30 tablet 0    spironolactone 100 MG Oral Tab Take 1 tablet (100 mg total) by mouth daily. 30 tablet 0    rifAXIMin 550 MG Oral Tab Take 1 tablet (550 mg total) by mouth 2 (two) times daily. 60 tablet 0    lactulose 10 GM/15ML Oral Solution Take 30 mL (20 g total) by mouth 3 (three) times daily. 2700 mL 0    pantoprazole 40 MG Oral Tab EC Take 1 tablet (40 mg total) by mouth 2 (two) times daily before meals. 180 tablet 0    simvastatin 40 MG Oral Tab Take 1 tablet (40 mg total) by mouth nightly. 90 tablet 3    sertraline 50 MG Oral Tab 1/2 tab daily x 6 days then 1 tab daily 90 tablet 3    triamcinolone 0.1 % External Cream Apply topically 2 (two) times daily as needed. Up to one week and one week break before restarting the cycle if needed 60 g 3    METOPROLOL SUCCINATE ER 50 MG Oral Tablet 24 Hr TAKE 1 TABLET(50 MG) BY MOUTH DAILY 90 tablet 0    Sildenafil Citrate (VIAGRA) 50 MG Oral Tab Take 1 tablet (50 mg total) by mouth daily as needed for Erectile Dysfunction. 8 tablet 12    METFORMIN 500 MG Oral Tab TAKE 1 TABLET(500 MG) BY MOUTH TWICE DAILY WITH MEALS 180 tablet 3     Allergies:Allergies[1]    Objective:   Physical Exam  Vitals and nursing note reviewed.   Constitutional:       General: He is not in acute distress.     Appearance: Normal appearance. He is well-developed. He is ill-appearing.   HENT:      Head: Normocephalic and atraumatic.      Right Ear: Tympanic membrane, ear canal and external ear normal.      Left Ear: Tympanic membrane, ear canal and external ear normal.      Nose: Nose normal.      Mouth/Throat:      Mouth: Mucous membranes are moist.   Eyes:      Extraocular Movements: Extraocular movements intact.      Conjunctiva/sclera: Conjunctivae normal.      Pupils: Pupils are equal, round, and reactive to light.   Neck:       Thyroid: No thyromegaly.   Cardiovascular:      Rate and Rhythm: Normal rate and regular rhythm.      Pulses: Normal pulses.      Heart sounds: Normal heart sounds.   Pulmonary:      Effort: Pulmonary effort is normal.      Breath sounds: Normal breath sounds. No wheezing or rales.   Abdominal:      General: Abdomen is protuberant. There is distension.      Palpations: Abdomen is soft. There is fluid wave.      Tenderness: There is no abdominal tenderness.   Musculoskeletal:         General: No tenderness. Normal range of motion.      Cervical back: Normal range of motion and neck supple.   Lymphadenopathy:      Cervical: No cervical adenopathy.   Skin:     General: Skin is warm and dry.      Findings: Laceration (well healing small linear laceration right forearm, 3 sutures in place) present. No rash.   Neurological:      General: No focal deficit present.      Mental Status: He is alert and oriented to person, place, and time.   Psychiatric:         Mood and Affect: Mood normal.         Behavior: Behavior normal.         Assessment & Plan:   1. Visit for suture removal  Under aseptic conditions, 3 simple interrupted sutures were removed.  Patient tolerated procedure well.  Wound appears to be well-healing and there are no signs of infection.  Advised proper home wound care.  Contact the office if symptoms worsen or if new symptoms develop such as fever, chills, swelling, redness, or wound drainage.    2. Generalized weakness  Patient was receiving PT in hospital which was helpful, will provide referral to help improve general strengthening and gait stability.  - Physical Therapy Referral - [facility]    3. Decompensated cirrhosis (HCC)  4. Esophageal varices in alcoholic cirrhosis (HCC)  5. Ascites due to alcoholic cirrhosis (HCC)  Hospital records reviewed at length. Patient feeling okay today other than generalized weakness. Still has not had alcohol in 3 weeks. Scheduled to follow up with GI next  month. Discussed signs and symptoms that warrant prompt medical attention and ER precautions.              [1]   Allergies  Allergen Reactions    Ace Inhibitors ANGIOEDEMA    Amlodipine SWELLING     Ankle edema, hard stools    Naproxen ANAPHYLAXIS     Passed out

## 2024-10-28 NOTE — PROGRESS NOTES
Patient went in for hospital follow-up appointment with primary care provider office on 10/22/24.  Encounter closing.

## 2024-11-10 ENCOUNTER — ANESTHESIA EVENT (OUTPATIENT)
Dept: ENDOSCOPY | Facility: HOSPITAL | Age: 64
End: 2024-11-10
Payer: MEDICARE

## 2024-11-10 NOTE — ANESTHESIA PREPROCEDURE EVALUATION
PRE-OP EVALUATION    Patient Name: Brody Bowden    Admit Diagnosis: Decompensation of cirrhosis of liver (HCC) [K72.90, K74.60]    Pre-op Diagnosis: Decompensation of cirrhosis of liver (HCC) [K72.90, K74.60]    ESOPHAGOGASTRODUODENOSCOPY (EGD)    Anesthesia Procedure: ESOPHAGOGASTRODUODENOSCOPY (EGD)    Surgeons and Role:     * Saurabh Tom MD - Primary    Pre-op vitals reviewed.        Body mass index is 23.6 kg/m².    Current medications reviewed.  Hospital Medications:  No current facility-administered medications on file as of .       Outpatient Medications:   Prescriptions Prior to Admission[1]    Allergies: Ace inhibitors, Amlodipine, and Naproxen      Anesthesia Evaluation        Anesthetic Complications           GI/Hepatic/Renal  Comment: Cirrhosis with ascitis, varices. Liver lesion              (+) liver disease                 Cardiovascular        Exercise tolerance: good     MET: >4      (+) hypertension   (+) hyperlipidemia               (+) dysrhythmias                   Endo/Other      (+) diabetes and well controlled, type 2,                          Pulmonary                    (+) sleep apnea and CPAP      Neuro/Psych                                   Anomalous atrioventricular excitation     Benign essential HTN     Abnormal EKG     ZANE on CPAP     Hypertriglyceridemia     Controlled type 2 diabetes mellitus without complication, without long-term current use of insulin (HCC)     Special screening for malignant neoplasm of colon     Benign neoplasm of descending colon     Benign neoplasm of rectum     Ascites due to alcoholic cirrhosis (HCC)     Liver lesion     Decompensated cirrhosis (HCC)            Past Surgical History:   Procedure Laterality Date    Abd paracentesis  10/15/2024    Colonoscopy      Kidney surgery      left nephrectomy, due to injury from football as a child    Knee replacement surgery      Knee surgery      Other  10/2011    card cath post proc, no stents     Repair  cruciate ligament,knee  01/01/1998    right x4    Total knee replacement       Social History     Socioeconomic History    Marital status:    Tobacco Use    Smoking status: Never    Smokeless tobacco: Current    Tobacco comments:     Chews tobacco daily   Vaping Use    Vaping status: Never Used   Substance and Sexual Activity    Alcohol use: Not Currently    Drug use: No   Other Topics Concern    Caffeine Concern Yes    Exercise Yes     History   Drug Use No     Available pre-op labs reviewed.  Lab Results   Component Value Date    WBC 7.6 10/16/2024    RBC 3.35 (L) 10/16/2024    HGB 12.0 (L) 10/17/2024    HCT 33.2 (L) 10/16/2024    MCV 99.1 10/16/2024    MCH 37.0 (H) 10/16/2024    MCHC 37.3 (H) 10/16/2024    RDW 13.3 10/16/2024    .0 (L) 10/16/2024     Lab Results   Component Value Date     (L) 10/16/2024    K 3.7 10/16/2024     10/16/2024    CO2 26.0 10/16/2024    BUN 14 10/16/2024    CREATSERUM 1.04 10/16/2024     (H) 10/16/2024    CA 8.8 10/16/2024            Airway      Mallampati: III  Mouth opening: <3 FB  TM distance: < 4 cm   Cardiovascular    Cardiovascular exam normal.         Dental  Comment: Patient does not endorse any loose teeth. Cannot exclude any vulnerable teeth given limitations of exam. Will reassess post induction and prior to any airway manipulation.                Pulmonary    Pulmonary exam normal.                 Other findings              ASA: 3   Plan: MAC  NPO status verified and patient meets guidelines.        Comment: We discussed a plan for MAC anesthesia, which likely will include deep sedation. I discussed that memory of the procedure is unlikely, but if interop recall occurs, it is typically absent or duress with this anesthetic.  The patient is aware that general anesthesia is not intended, though deep sedation may be warranted.  All questions were answered. The patient expresses understanding and agreement with plan. The consent was signed  without further questions.          Plan/risks discussed with: patient                Present on Admission:  **None**             [1]   No medications prior to admission.

## 2024-11-11 ENCOUNTER — ANESTHESIA (OUTPATIENT)
Dept: ENDOSCOPY | Facility: HOSPITAL | Age: 64
End: 2024-11-11
Payer: MEDICARE

## 2024-11-11 ENCOUNTER — HOSPITAL ENCOUNTER (OUTPATIENT)
Facility: HOSPITAL | Age: 64
Setting detail: HOSPITAL OUTPATIENT SURGERY
Discharge: HOME | End: 2024-11-11
Attending: INTERNAL MEDICINE | Admitting: INTERNAL MEDICINE
Payer: MEDICARE

## 2024-11-11 VITALS
WEIGHT: 167 LBS | HEART RATE: 72 BPM | BODY MASS INDEX: 22.62 KG/M2 | DIASTOLIC BLOOD PRESSURE: 68 MMHG | HEIGHT: 72 IN | SYSTOLIC BLOOD PRESSURE: 117 MMHG | RESPIRATION RATE: 17 BRPM | TEMPERATURE: 97 F | OXYGEN SATURATION: 99 %

## 2024-11-11 DIAGNOSIS — K74.60 DECOMPENSATION OF CIRRHOSIS OF LIVER (HCC): ICD-10-CM

## 2024-11-11 DIAGNOSIS — K72.90 DECOMPENSATION OF CIRRHOSIS OF LIVER (HCC): ICD-10-CM

## 2024-11-11 LAB — GLUCOSE BLD-MCNC: 98 MG/DL (ref 70–99)

## 2024-11-11 PROCEDURE — 88305 TISSUE EXAM BY PATHOLOGIST: CPT | Performed by: INTERNAL MEDICINE

## 2024-11-11 PROCEDURE — 0DB78ZX EXCISION OF STOMACH, PYLORUS, VIA NATURAL OR ARTIFICIAL OPENING ENDOSCOPIC, DIAGNOSTIC: ICD-10-PCS | Performed by: INTERNAL MEDICINE

## 2024-11-11 PROCEDURE — 0DB98ZX EXCISION OF DUODENUM, VIA NATURAL OR ARTIFICIAL OPENING ENDOSCOPIC, DIAGNOSTIC: ICD-10-PCS | Performed by: INTERNAL MEDICINE

## 2024-11-11 PROCEDURE — 82962 GLUCOSE BLOOD TEST: CPT

## 2024-11-11 RX ORDER — DEXTROSE MONOHYDRATE 25 G/50ML
50 INJECTION, SOLUTION INTRAVENOUS
Status: DISCONTINUED | OUTPATIENT
Start: 2024-11-11 | End: 2024-11-11

## 2024-11-11 RX ORDER — NALOXONE HYDROCHLORIDE 0.4 MG/ML
0.08 INJECTION, SOLUTION INTRAMUSCULAR; INTRAVENOUS; SUBCUTANEOUS ONCE AS NEEDED
OUTPATIENT
Start: 2024-11-11 | End: 2024-11-11

## 2024-11-11 RX ORDER — NICOTINE POLACRILEX 4 MG
30 LOZENGE BUCCAL
Status: DISCONTINUED | OUTPATIENT
Start: 2024-11-11 | End: 2024-11-11

## 2024-11-11 RX ORDER — SODIUM CHLORIDE, SODIUM LACTATE, POTASSIUM CHLORIDE, CALCIUM CHLORIDE 600; 310; 30; 20 MG/100ML; MG/100ML; MG/100ML; MG/100ML
INJECTION, SOLUTION INTRAVENOUS CONTINUOUS
Status: DISCONTINUED | OUTPATIENT
Start: 2024-11-11 | End: 2024-11-11

## 2024-11-11 RX ORDER — SODIUM CHLORIDE, SODIUM LACTATE, POTASSIUM CHLORIDE, CALCIUM CHLORIDE 600; 310; 30; 20 MG/100ML; MG/100ML; MG/100ML; MG/100ML
INJECTION, SOLUTION INTRAVENOUS CONTINUOUS
OUTPATIENT
Start: 2024-11-11

## 2024-11-11 RX ORDER — NICOTINE POLACRILEX 4 MG
15 LOZENGE BUCCAL
Status: DISCONTINUED | OUTPATIENT
Start: 2024-11-11 | End: 2024-11-11

## 2024-11-11 RX ADMIN — SODIUM CHLORIDE, SODIUM LACTATE, POTASSIUM CHLORIDE, CALCIUM CHLORIDE: 600; 310; 30; 20 INJECTION, SOLUTION INTRAVENOUS at 10:13:00

## 2024-11-11 NOTE — ANESTHESIA POSTPROCEDURE EVALUATION
[facility]    Brody Bowden Patient Status:  Hospital Outpatient Surgery   Age/Gender 64 year old male MRN [id number]   Location [facility] ENDOSCOPY PAIN CENTER Attending Saurabh Tom MD   Hosp Day # 0 PCP Tiarra Lawson DO       Anesthesia Post-op Note    ESOPHAGOGASTRODUODENOSCOPY (EGD) WITH BIOPSIES    Procedure Summary       Date: 11/11/24 Room / Location:  ENDOSCOPY 02 /  ENDOSCOPY    Anesthesia Start: 1013 Anesthesia Stop: 1030    Procedure: ESOPHAGOGASTRODUODENOSCOPY (EGD) WITH BIOPSIES Diagnosis:       Decompensation of cirrhosis of liver (HCC)      (PORTAL HYPERTENSIVE GASTROPATHY)    Surgeons: Saurabh Tom MD Anesthesiologist: Savanah Maria MD    Anesthesia Type: MAC ASA Status: 3            Anesthesia Type: MAC    Vitals Value Taken Time   /69 11/11/24 1036   Temp  11/11/24 1038   Pulse 74 11/11/24 1037   Resp 16 11/11/24 1030   SpO2 100 % 11/11/24 1037   Vitals shown include unfiled device data.    Patient Location: Endoscopy    Anesthesia Type: MAC    Airway Patency: patent    Postop Pain Control: adequate    Mental Status: preanesthetic baseline    Nausea/Vomiting: none    Cardiopulmonary/Hydration status: stable euvolemic    Complications: no apparent anesthesia related complications    Postop vital signs: stable    Dental Exam: Unchanged from Preop    Patient to be discharged home when criteria met.

## 2024-11-11 NOTE — DISCHARGE INSTRUCTIONS

## 2024-11-11 NOTE — OPERATIVE REPORT
EGD Operative Report  Patient Name: Brody Bowden  YOB: 1960  MRN: [id number]  Procedure: Esophagogastroduodenoscopy (EGD)  with cold forceps biopsies  Pre-operative Diagnosis & Indication: esophageal varices  Post-operative Diagnosis:   Small esophageal varices that completely deflate with insufflation, not banded  Severe portal hypertensive gastropathy  Mildly nodular antrum with a few whitish plaques present, s/p cold forceps biopsies  Mildly nodular duodenal bulb s/p cold forceps biopsies  Attending Endoscopist: Saurabh Tom MD  Informed Consent: The planned procedure(s), the explanation of the procedure, its expected benefits, the potential complications and risks and possible alternatives and their benefits and risks were discussed with the patient or the patient's surrogate. The discussion of risks, not limited to but including bleeding, infection, perforation, adverse effects from anesthesia, need for emergency surgery/prolonged hospitalization,  cardiac arrhythmias,  and aspiration were discussed with patient.  Pt and/or surrogate understood the proposed procedure(s), its risks, benefits and alternatives and wish to proceed with procedure(s). All questions answered in full.  After all questions were answered to their satisfaction, a signed, informed, and witnessed consent was obtained.  Physical Exam: Heart: regular rate and rhythm. No rubs, murmurs, or gallops. Lungs: Clear to auscultation bilaterally. Abdomen: Soft, non-tender, non distended. No rebound tenderness, no guarding.   A TIME OUT WAS COMPLETED prior to the procedure to confirm the patient, procedure(s) and complete endoscopy safety procedure.  Sedation: Monitored Anesthesia Care; ASA class per anesthesiology team   Monitoring: Pulsoximetry, pulse, respirations, and blood pressure , vitals were monitored throughout the entire procedure under monitored anesthesia care.   Procedure: The patient was then brought to the endoscopy suite  where his/her pulse, pulse oximetry and blood pressure were monitored. The patient was placed in the left lateral decubitus position and deep sedation was administered. Once adequate sedation was achieved, a bite block was placed and a lubricated tip of an Olympus video upper endoscope was inserted through the oropharynx and gently manipulated through the esophagus into the stomach and the second portion of the duodenum. Upon withdrawal of the endoscope, careful visualization of the mucosa was performed. The endoscope was then withdrawn into the gastric antrum and placed in a retroflexed position.  The endoscope was then righted, and air was suctioned from the stomach.  The endoscope was then withdrawn from the patient, with careful visual inspection of the mucosa. The patient left the procedure room in stable condition for recovery. Findings and endotherapy as listed below  Toleration: Patient tolerated procedure well   Complications: No immediate complications   Technical Difficulty:  The procedure was not technically difficult   Estimated Blood Loss: Minimal, less than 5mL of estimated blood loss.   Findings and Therapeutics:  Esophagus:   The mucosa was normal. There were small varices in the distal esophagus that deflated completely with insufflation.  There were no strictures or stenosis. GEJ junction traversed with endoscope without resistance.  The Z-line was irregular, appreciated at 40 cm from the incisors.      Stomach:    Severe portal hypertensive gastropathy, non-bleeding, was present in the entire stomach, and there was mildly nodular antral mucosa with a few small overlying white plaques; this nodular mucosa was biopsied with a cold forceps for histology. Otherwise, the gastric body, antrum, fundus, cardia, and angularis were normal. Endoscope was placed in a retroflexion view in the stomach. There was  no evidence of a hiatal hernia or gastric varices.   Duodenum:   There was patchy, mildly nodular  duodenal bulb mucosa, otherwise the  entire examined duodenum was normal.  Biopsies with cold forceps were obtained of the nodular mucosa  Recommendations:  Post EGD precautions, watch for bleeding, infection, perforation, adverse drug reactions   Follow-up biopsies  Omeprazole 40 mg once daily  Avoid non-aspirin NSAID  Repeat EGD in 4 months to re-assess varices    Saurabh Tom MD  11/11/2024  10:26 AM

## 2024-11-11 NOTE — H&P
History & Physical Examination    Patient Name: Brody Bowden  MRN: [id number]  CSN: [id number]  YOB: 1960    Diagnosis: cirrhosis, EV    Present Illness: 65 y/o M history of non-bleeding EV s/p banding presents for EV surveillance    Prescriptions Prior to Admission[1]  Current Facility-Administered Medications   Medication Dose Route Frequency    glucose (Dex4) 15 GM/59ML oral liquid 15 g  15 g Oral Q15 Min PRN    Or    glucose (Glutose) 40% oral gel 15 g  15 g Oral Q15 Min PRN    Or    glucose-vitamin C (Dex-4) chewable tab 4 tablet  4 tablet Oral Q15 Min PRN    Or    dextrose 50% injection 50 mL  50 mL Intravenous Q15 Min PRN    Or    glucose (Dex4) 15 GM/59ML oral liquid 30 g  30 g Oral Q15 Min PRN    Or    glucose (Glutose) 40% oral gel 30 g  30 g Oral Q15 Min PRN    Or    glucose-vitamin C (Dex-4) chewable tab 8 tablet  8 tablet Oral Q15 Min PRN    lactated ringers infusion   Intravenous Continuous       Allergies: Allergies[2]    Past Medical History:    Abdominal distention    Abdominal pain    Acquired absence of kidney    left kidney removed at 10 years old, injury from football    Bloating    Diarrhea, unspecified    Disorder of liver    Diverticulosis of large intestine    Flatulence/gas pain/belching    High blood pressure    High cholesterol    Osteoarthrosis, unspecified whether generalized or localized, lower leg        GLOBAL EXP 7-5-15 / Right Knee / [place] / [place]    S/P total knee arthroplasty    Sleep apnea    no device    Status post total right knee replacement    Type II or unspecified type diabetes mellitus without mention of complication, not stated as uncontrolled    Uncomfortable fullness after meals    Unspecified essential hypertension    Visual impairment    Wears glasses     Past Surgical History:   Procedure Laterality Date    Abd paracentesis  10/15/2024    Colonoscopy      Kidney surgery      left nephrectomy, due to injury from football as a child    Knee replacement  surgery      Knee surgery      Other  10/2011    card cath post proc, no stents     Repair cruciate ligament,knee  01/01/1998    right x4    Total knee replacement       Family History   Problem Relation Age of Onset    Colon Cancer Father     Cancer Father         colon and liver    Prostate Cancer Father     Other (pulmonary embolism) Father     Cancer Mother     Alcohol and Other Disorders Associated Mother     Crohn's Disease Mother     Other (crohn's) Mother         2009, cancer,     Other (lung removed) Mother         2013    Other (lung infection) Mother     Other (alzheimer's dementia) Mother     Other (drug dependence) Son         herion, clean now    Colon Polyps Sister     Crohn's Disease Sister     Other (lyme disease) Sister     Other ([place] virus) Sister     Other (copperhead snake bite) Sister     Prostate Cancer Brother 61        finished treatment 2/2019; recurred 2021 lymph nodes    Other (pulmonary embolism) Brother     Crohn's Disease Niece      Social History     Tobacco Use    Smoking status: Never    Smokeless tobacco: Current    Tobacco comments:     Chews tobacco daily   Substance Use Topics    Alcohol use: Not Currently       SYSTEM Check if Review is Normal Check if Physical Exam is Normal If not normal, please explain:   HEENT [ x] [x ]    NECK & BACK [ x] [ x]    HEART [ x] [x ]    LUNGS [ x] [ x]    ABDOMEN [ x] [x ]    UROGENITAL [ n/a] [ n/a]    EXTREMITIES [ x] [x ]    OTHER        [ x ] I have discussed the risks and benefits and alternatives with the patient/family.  They understand and agree to proceed with plan of care.  [ x ] I have reviewed the History and Physical done within the last 30 days.  Any changes noted above.    Saurabh Tom MD  11/11/2024  10:12 AM             [1]   Medications Prior to Admission   Medication Sig Dispense Refill Last Dose/Taking    rifAXIMin (XIFAXAN) 550 MG Oral Tab Take 1 tablet (550 mg total) by mouth 2 (two) times daily for 14 days. 28  tablet 0     Cholecalciferol 125 MCG (5000 UT) Oral Tab Take 1 tablet (5,000 Units total) by mouth daily.   11/10/2024    furosemide (LASIX) 20 MG Oral Tab Take 1 tablet (20 mg total) by mouth daily. 90 tablet 3 11/10/2024    furosemide 40 MG Oral Tab Take 1 tablet (40 mg total) by mouth daily. 30 tablet 0 11/10/2024    spironolactone 100 MG Oral Tab Take 1 tablet (100 mg total) by mouth daily. 30 tablet 0 11/10/2024    rifAXIMin 550 MG Oral Tab Take 1 tablet (550 mg total) by mouth 2 (two) times daily. 60 tablet 0 11/10/2024    lactulose 10 GM/15ML Oral Solution Take 30 mL (20 g total) by mouth 3 (three) times daily. 2700 mL 0 11/10/2024    pantoprazole 40 MG Oral Tab EC Take 1 tablet (40 mg total) by mouth 2 (two) times daily before meals. 180 tablet 0 11/10/2024    simvastatin 40 MG Oral Tab Take 1 tablet (40 mg total) by mouth nightly. 90 tablet 3 11/10/2024    sertraline 50 MG Oral Tab 1/2 tab daily x 6 days then 1 tab daily 90 tablet 3 11/10/2024    triamcinolone 0.1 % External Cream Apply topically 2 (two) times daily as needed. Up to one week and one week break before restarting the cycle if needed 60 g 3 11/10/2024    METOPROLOL SUCCINATE ER 50 MG Oral Tablet 24 Hr TAKE 1 TABLET(50 MG) BY MOUTH DAILY 90 tablet 0 11/9/2024    METFORMIN 500 MG Oral Tab TAKE 1 TABLET(500 MG) BY MOUTH TWICE DAILY WITH MEALS 180 tablet 3 Past Week    Sildenafil Citrate (VIAGRA) 50 MG Oral Tab Take 1 tablet (50 mg total) by mouth daily as needed for Erectile Dysfunction. 8 tablet 12 More than a month   [2]   Allergies  Allergen Reactions    Ace Inhibitors ANGIOEDEMA    Amlodipine SWELLING     Ankle edema, hard stools    Naproxen ANAPHYLAXIS     Passed out

## 2024-11-21 ENCOUNTER — OFFICE VISIT (OUTPATIENT)
Dept: FAMILY MEDICINE CLINIC | Facility: CLINIC | Age: 64
End: 2024-11-21
Payer: MEDICARE

## 2024-11-21 VITALS
DIASTOLIC BLOOD PRESSURE: 58 MMHG | HEIGHT: 72 IN | SYSTOLIC BLOOD PRESSURE: 106 MMHG | BODY MASS INDEX: 23.16 KG/M2 | RESPIRATION RATE: 16 BRPM | OXYGEN SATURATION: 97 % | WEIGHT: 171 LBS | HEART RATE: 84 BPM

## 2024-11-21 DIAGNOSIS — D64.9 ANEMIA, UNSPECIFIED TYPE: Primary | ICD-10-CM

## 2024-11-21 DIAGNOSIS — K70.31 ALCOHOLIC CIRRHOSIS OF LIVER WITH ASCITES (HCC): ICD-10-CM

## 2024-11-21 DIAGNOSIS — R73.09 ELEVATED HEMOGLOBIN A1C: ICD-10-CM

## 2024-11-21 DIAGNOSIS — R25.1 TREMOR: ICD-10-CM

## 2024-11-21 PROCEDURE — 99214 OFFICE O/P EST MOD 30 MIN: CPT | Performed by: FAMILY MEDICINE

## 2024-11-21 RX ORDER — SPIRONOLACTONE 100 MG/1
100 TABLET, FILM COATED ORAL DAILY
Qty: 90 TABLET | Refills: 1 | Status: SHIPPED | OUTPATIENT
Start: 2024-11-21

## 2024-11-22 NOTE — PROGRESS NOTES
Subjective:   Patient ID: Brody Bowden is a 64 year old male.    Alcoholic liver cirrhosis.  No alcohol for > 1 month.  Had ascites drained twice.  On high dose diuretics.  Seeing Dr. Mitchell now.  On lactulose also.  Denied desire to drink.  + fatigue. States mood controlled. Wife thinks he is less motivated.  Denied crying. Denied suicidal and homicidal thoughts.  Doesn't want to increase SSRI.    1. Anemia, unspecified type.  Denied any bleeding had non bleeding esophageal varices clipped.      Hx of Elevated hemoglobin A1c. Hx of DMII. Was on metformin. Stopped by GI. Sugars still good.    Tremor b/l hands.  Trouble with balancing.  No treatment tried.        History/Other:   Review of Systems   All other systems reviewed and are negative.    Current Outpatient Medications   Medication Sig Dispense Refill    spironolactone 100 MG Oral Tab Take 1 tablet (100 mg total) by mouth daily. 90 tablet 1    Cholecalciferol 125 MCG (5000 UT) Oral Tab Take 1 tablet (5,000 Units total) by mouth daily.      furosemide (LASIX) 20 MG Oral Tab Take 1 tablet (20 mg total) by mouth daily. 90 tablet 3    pantoprazole 40 MG Oral Tab EC Take 1 tablet (40 mg total) by mouth 2 (two) times daily before meals. 180 tablet 0    simvastatin 40 MG Oral Tab Take 1 tablet (40 mg total) by mouth nightly. 90 tablet 3    sertraline 50 MG Oral Tab 1/2 tab daily x 6 days then 1 tab daily 90 tablet 3    triamcinolone 0.1 % External Cream Apply topically 2 (two) times daily as needed. Up to one week and one week break before restarting the cycle if needed 60 g 3    Sildenafil Citrate (VIAGRA) 50 MG Oral Tab Take 1 tablet (50 mg total) by mouth daily as needed for Erectile Dysfunction. 8 tablet 12     Allergies:Allergies[1]    Objective:   Physical Exam  Vitals reviewed.   Constitutional:       General: He is not in acute distress.     Appearance: He is well-developed. He is not diaphoretic.   Eyes:      General: No scleral icterus.        Right eye: No  discharge.         Left eye: No discharge.      Conjunctiva/sclera: Conjunctivae normal.   Cardiovascular:      Rate and Rhythm: Normal rate and regular rhythm.      Heart sounds: Normal heart sounds.   Pulmonary:      Effort: Pulmonary effort is normal. No respiratory distress.      Breath sounds: Normal breath sounds. No wheezing or rales.   Abdominal:      General: Bowel sounds are normal. There is distension.      Palpations: Abdomen is soft.      Tenderness: There is no abdominal tenderness.   Neurological:      Comments: Mild tremor b/l hands.         Assessment & Plan:   1. Anemia, unspecified type    2. Alcoholic cirrhosis of liver with ascites (HCC)    3. Elevated hemoglobin A1c    4. Tremor      1. Anemia, unspecified type  - Ferritin; Future    2. Alcoholic cirrhosis of liver with ascites (HCC)  Pt to discuss lasix, spironolactone, lactulose with GI doctor.  - spironolactone 100 MG Oral Tab; Take 1 tablet (100 mg total) by mouth daily.  Dispense: 90 tablet; Refill: 1  - CBC W Differential W Platelet [E]; Future  - PTT, Activated [E]; Future  - Prothrombin Time (PT) [E]; Future  - Comp Metabolic Panel (14) [E]; Future  - Ammonia, Plasma [E]; Future    3. Elevated hemoglobin A1c  - Hemoglobin A1C; Future    4. Tremor  On B1 vitamin  No longer drinking.  - Neuro Referral - In Network      Orders Placed This Encounter   Procedures    Ferritin    CBC W Differential W Platelet [E]    PTT, Activated [E]    Prothrombin Time (PT) [E]    Comp Metabolic Panel (14) [E]    Hemoglobin A1C    Ammonia, Plasma [E]       Meds This Visit:  Requested Prescriptions     Signed Prescriptions Disp Refills    spironolactone 100 MG Oral Tab 90 tablet 1     Sig: Take 1 tablet (100 mg total) by mouth daily.       Imaging & Referrals:  NEURO - INTERNAL         [1]   Allergies  Allergen Reactions    Ace Inhibitors ANGIOEDEMA    Amlodipine SWELLING     Ankle edema, hard stools    Naproxen ANAPHYLAXIS     Passed out

## 2024-11-26 ENCOUNTER — LAB ENCOUNTER (OUTPATIENT)
Dept: LAB | Age: 64
End: 2024-11-26
Attending: FAMILY MEDICINE
Payer: MEDICARE

## 2024-11-26 DIAGNOSIS — D64.9 ANEMIA, UNSPECIFIED TYPE: ICD-10-CM

## 2024-11-26 DIAGNOSIS — K70.31 ALCOHOLIC CIRRHOSIS OF LIVER WITH ASCITES (HCC): ICD-10-CM

## 2024-11-26 LAB
ALBUMIN SERPL-MCNC: 3.3 G/DL (ref 3.2–4.8)
ALBUMIN/GLOB SERPL: 0.6 {RATIO} (ref 1–2)
ALP LIVER SERPL-CCNC: 102 U/L
ALT SERPL-CCNC: 68 U/L
AMMONIA PLAS-MCNC: 71 UMOL/L (ref 11–32)
ANION GAP SERPL CALC-SCNC: 5 MMOL/L (ref 0–18)
APTT PPP: 33.2 SECONDS (ref 23–36)
AST SERPL-CCNC: 96 U/L (ref ?–34)
BASOPHILS # BLD AUTO: 0.07 X10(3) UL (ref 0–0.2)
BASOPHILS NFR BLD AUTO: 1.1 %
BILIRUB SERPL-MCNC: 3.1 MG/DL (ref 0.2–1.1)
BUN BLD-MCNC: 28 MG/DL (ref 9–23)
CALCIUM BLD-MCNC: 9.7 MG/DL (ref 8.7–10.4)
CHLORIDE SERPL-SCNC: 101 MMOL/L (ref 98–112)
CO2 SERPL-SCNC: 30 MMOL/L (ref 21–32)
CREAT BLD-MCNC: 1.23 MG/DL
DEPRECATED HBV CORE AB SER IA-ACNC: 413 NG/ML
EGFRCR SERPLBLD CKD-EPI 2021: 65 ML/MIN/1.73M2 (ref 60–?)
EOSINOPHIL # BLD AUTO: 0.5 X10(3) UL (ref 0–0.7)
EOSINOPHIL NFR BLD AUTO: 7.8 %
ERYTHROCYTE [DISTWIDTH] IN BLOOD BY AUTOMATED COUNT: 13.2 %
FASTING STATUS PATIENT QL REPORTED: NO
GLOBULIN PLAS-MCNC: 5.3 G/DL (ref 2–3.5)
GLUCOSE BLD-MCNC: 130 MG/DL (ref 70–99)
HCT VFR BLD AUTO: 33.3 %
HGB BLD-MCNC: 12.1 G/DL
IMM GRANULOCYTES # BLD AUTO: 0.01 X10(3) UL (ref 0–1)
IMM GRANULOCYTES NFR BLD: 0.2 %
INR BLD: 1.5 (ref 0.8–1.2)
LYMPHOCYTES # BLD AUTO: 1.39 X10(3) UL (ref 1–4)
LYMPHOCYTES NFR BLD AUTO: 21.7 %
MCH RBC QN AUTO: 36.2 PG (ref 26–34)
MCHC RBC AUTO-ENTMCNC: 36.3 G/DL (ref 31–37)
MCV RBC AUTO: 99.7 FL
MONOCYTES # BLD AUTO: 0.9 X10(3) UL (ref 0.1–1)
MONOCYTES NFR BLD AUTO: 14 %
NEUTROPHILS # BLD AUTO: 3.55 X10 (3) UL (ref 1.5–7.7)
NEUTROPHILS # BLD AUTO: 3.55 X10(3) UL (ref 1.5–7.7)
NEUTROPHILS NFR BLD AUTO: 55.2 %
OSMOLALITY SERPL CALC.SUM OF ELEC: 289 MOSM/KG (ref 275–295)
PLATELET # BLD AUTO: 153 10(3)UL (ref 150–450)
POTASSIUM SERPL-SCNC: 3.5 MMOL/L (ref 3.5–5.1)
PROT SERPL-MCNC: 8.6 G/DL (ref 5.7–8.2)
PROTHROMBIN TIME: 18.2 SECONDS (ref 11.6–14.8)
RBC # BLD AUTO: 3.34 X10(6)UL
SODIUM SERPL-SCNC: 136 MMOL/L (ref 136–145)
WBC # BLD AUTO: 6.4 X10(3) UL (ref 4–11)

## 2024-11-26 PROCEDURE — 80053 COMPREHEN METABOLIC PANEL: CPT

## 2024-11-26 PROCEDURE — 85025 COMPLETE CBC W/AUTO DIFF WBC: CPT

## 2024-11-26 PROCEDURE — 85610 PROTHROMBIN TIME: CPT

## 2024-11-26 PROCEDURE — 82140 ASSAY OF AMMONIA: CPT

## 2024-11-26 PROCEDURE — 36415 COLL VENOUS BLD VENIPUNCTURE: CPT

## 2024-11-26 PROCEDURE — 82728 ASSAY OF FERRITIN: CPT

## 2024-11-26 PROCEDURE — 85730 THROMBOPLASTIN TIME PARTIAL: CPT

## 2024-12-10 ENCOUNTER — OFFICE VISIT (OUTPATIENT)
Facility: LOCATION | Age: 64
End: 2024-12-10
Attending: PHYSICIAN ASSISTANT
Payer: MEDICARE

## 2024-12-10 DIAGNOSIS — R53.1 GENERALIZED WEAKNESS: Primary | ICD-10-CM

## 2024-12-10 PROCEDURE — 97162 PT EVAL MOD COMPLEX 30 MIN: CPT

## 2024-12-10 NOTE — PROGRESS NOTES
INITIAL EVALUATION   Referring Physician: Dr. Ross  Diagnosis: Generalized weakness (R53.1)      Date of Service: 12/10/2024     PATIENT SUMMARY/ASSESSMENT   Brody Bowden is a 64 year old y/o male who presents to therapy today with complaints of generalized weakness, worsening balance and difficulty with walking/activity following hospitalization approximately 6 weeks ago for cirrhosis. He reports he has been feeling a little better since returning home, but continues to feel weak and have poor endurance, balance and strength  There are 1 personal factors and co-morbidities influencing plan of care, including medical complications, making treatment more difficult  There are 3 or more Body Structures/Functions, Activity Limitations and Participation Restrictions, including decreased LE flexibility, decreased balance, difficulty with walking long distances  Clinical Presentation: Evolving   Brody describes prior level of function as independent with ADLs, self-care tasks and ambulation. Pt goals include improving his strength, balance and tolerance for activity.  Past medical history was reviewed with Brody. Significant findings include  has a past medical history of Abdominal distention, Abdominal pain, Acquired absence of kidney, Bloating, Diarrhea, unspecified, Disorder of liver, Diverticulosis of large intestine, Flatulence/gas pain/belching, High blood pressure, High cholesterol, Osteoarthrosis, unspecified whether generalized or localized, lower leg        GLOBAL EXP 7-5-15 / Right Knee / [place] / [place] (06/24/2014), S/P total knee arthroplasty (04/06/2015), Sleep apnea, Status post total right knee replacement (04/29/2016), Type II or unspecified type diabetes mellitus without mention of complication, not stated as uncontrolled, Uncomfortable fullness after meals, Unspecified essential hypertension, Visual impairment, and Wears glasses.  Brody would benefit from skilled Physical Therapy to address the above  impairments to improve strength, balance and endurance      Positive History of Falls: no    FES Score  Score: (Patient-Rptd) 59.38 % (12/9/2024  4:27 PM)    Score and level of concern: (Patient-Rptd) 38 - high concern (12/9/2024  4:27 PM)      Precautions:  None    OBJECTIVE:   Physical Exam:  Posture/Observation: The patient sits with poor posture with a slightly forward head and rounded shoudlers    LE Strength: Hip flexors 5/5, Quadriceps 5/5, Hamstrings 5/5, Ankle dorsiflexors 5/5  LE Flexibility: Severe limitations in bilateral hamstrings, Moderate limitations in bilateral piriformis               Postural Control:  4-Stage Balance Test:   - Feet together: >30 sec   - Modified Tandem:  >30 sec   - Tandem Stance: >20 sec Fall Risk: no   - SLS: R 5 sec, L 5 sec  Fall Risk: yes  [Full tandem stance <10 sec indicates increased risk of falling]  Age appropriate norms for SLS: 60-70 y/o mean = 27.0 sec      70-78 y/o mean = 17.2 sec      80-98 y/o mean = 8.5 sec     Functional Mobility:  30 sec sit<>stand: 8   Fall Risk: yes  [Below average score indicates high risk for falls; norms by age > or = to...   60-63 y/o Men: 14, Women: 12   65-70 y/o Men: 12, Women: 11   70-73 y/o Men: 12, Women: 10   75-78 y/o Men: 11, Women: 10   80-85 y/o Men: 10, Women: 9   85-90 y/o Men: 8, Women: 8   90-95 y/o Men 7, Women: 4]    Gait The patient ambulates with his eyes looking down at the floor and decreased step length and push off bilaterally     TUG (AD, time): 15 sec    Fall Risk: yes  [Performance exceeding the upper limit of confidence intervals are considered increased risk for falls; Thomas, 2006...   60-70 y/o mean 8.1s (7.1-9.0s)   70-78 y/o mean 9.2s (8.2-10.2s)   80-98 y/o mean 11.3s (10.0-12.7s)]    Today's treatment: Reviewed plan of care and role of physical therapy. Provided HEP, including sit to stand , supine active HS stretch and seated bilateral shoulder ER with band  : PT Eval Moderate complexity x1       Total Timed Treatment: 4 min     Total Treatment Time: 40 min     PLAN OF CARE:    Goals: ( To be met in 12 visits)  Pt will demonstrate improved SLS to >10 seconds STEFANI to promote safety and decrease risk of falls on uneven surfaces such as grass   Pt will perform TUG in <10 seconds with least restrictive AD, demonstrating improved gait speed for improved participation in ADL such as community ambulation  Pt will be able to squat to  light objects around the house without loss of stability  Pt will improve functional hip strength to demonstrate ability to ascend/descend 1 flight of stairs reciprocally without use of handrail  Pt will ambulate community distances of at least 2000 feet to facilitate participation in community activities  Pt will be independent and compliant with comprehensive HEP to maintain progress achieved in PT    Frequency / Duration: Patient will be seen for 1-2 x/week or a total of 12 visits over a 90 day period. Treatment will include: Neuromuscular Re-education; Gait Training; Therapeutic Exercises; Manual Therapy;     Education or treatment limitation: None  Rehab Potential:good    Patient/Family/Caregiver was advised of these findings, precautions, and treatment options and has agreed to actively participate in planning and for this course of care.    Thank you for your referral. Please co-sign or sign and return this letter via fax as soon as possible to [phone]. If you have any questions, please contact me at Dept: [phone]    Sincerely,  Electronically signed by therapist: Prosper De La Cruz, PT    Physician's certification required: Yes  I certify the need for these services furnished under this plan of treatment and while under my care.    X___________________________________________________ Date____________________    Certification From: 12/10/2024  To:3/10/2025

## 2024-12-17 ENCOUNTER — OFFICE VISIT (OUTPATIENT)
Facility: LOCATION | Age: 64
End: 2024-12-17
Attending: PHYSICIAN ASSISTANT
Payer: MEDICARE

## 2024-12-17 PROCEDURE — 97110 THERAPEUTIC EXERCISES: CPT

## 2024-12-17 NOTE — PROGRESS NOTES
Dx: Generalized weakness (R53.1)           Authorized # of Visits:  12  Fall Risk: standard         Precautions: n/a             Subjective:   The patient reports he's been having good and bad days  Current Pain Ratin/10  Objective:   See flow sheet    Assessment:   The patient tolerated exercises well with no significant complaints of fatigue or pain    Plan:   Continue PT to address soft tissue and joint restrictions and provide instruction in a progressive therapeutic exercise program with manual and verbal cueing for proper form and technique    Date: 2024  Tx#:  Date:   Tx#: 3/ Date:   Tx#: 4/ Date:   Tx#: 5/ Date:   Tx#: 6/ Date:   Tx#: 7/ Date:   Tx#: 8/   TherEx (40 min) TherEx TherEx TherEx TherEx TherEx TherEx   Nustep L4 x 10 min         Supine active HS stretch x 10 with 5 sec holds         H/L clams green band 2 x 15         Sit to stand 2 x 10         Supine horizontal abd and diagonals green band 2 x 15 each         Seated bilateral shoulder ER green band 2 x 15         Reviewed HEP         -         -         HEP: Sit to stand, Seated bilateral shoulder ER, Supine horizontal abd and diagonals with band, H/L clams with band    Charges: TherEx x 3       Total Timed Treatment: 40 min  Total Treatment Time: 40 min

## 2024-12-19 ENCOUNTER — OFFICE VISIT (OUTPATIENT)
Facility: LOCATION | Age: 64
End: 2024-12-19
Attending: PHYSICIAN ASSISTANT
Payer: MEDICARE

## 2024-12-19 PROCEDURE — 97110 THERAPEUTIC EXERCISES: CPT

## 2024-12-19 NOTE — PROGRESS NOTES
Dx: Generalized weakness (R53.1)           Authorized # of Visits:  12  Fall Risk: standard         Precautions: n/a             Subjective:   The patient reports he felt ok after last session and wasn't too tired or sore  Current Pain Ratin/10  Objective:   See flow sheet    Assessment:   The patient tolerated progression of balance and strengthening exercises well without significant fatigue    Plan:   Continue PT to address soft tissue and joint restrictions and provide instruction in a progressive therapeutic exercise program with manual and verbal cueing for proper form and technique    Date: 2024  Tx#:  Date:  2024   Tx#: 3 Date:   Tx#: 4/ Date:   Tx#: 5/ Date:   Tx#: 6/ Date:   Tx#: 7/ Date:   Tx#: 8/   TherEx (40 min) TherEx (40 min) TherEx TherEx TherEx TherEx TherEx   Nustep L4 x 10 min Nustep L5 x 10 min        Supine active HS stretch x 10 with 5 sec holds Standing on Airex with trunk/head rotation        H/L clams green band 2 x 15 Standing marching 2 x 10        Sit to stand 2 x 10 Shuttle Bilateral 75# 3 x 15        Supine horizontal abd and diagonals green band 2 x 15 each H/L clams green band 2 x 15        Seated bilateral shoulder ER green band 2 x 15 Supine horizontal abd green band 2 x 15        Reviewed HEP Supine active HS stretch x 10 with 5 sec holds        - Reviewed HEP        - -        HEP: Sit to stand, Seated bilateral shoulder ER, Supine horizontal abd and diagonals with band, H/L clams with band    Charges: TherEx x 3       Total Timed Treatment: 40 min  Total Treatment Time: 40 min

## 2024-12-24 ENCOUNTER — OFFICE VISIT (OUTPATIENT)
Facility: LOCATION | Age: 64
End: 2024-12-24
Attending: PHYSICIAN ASSISTANT
Payer: MEDICARE

## 2024-12-24 PROCEDURE — 97110 THERAPEUTIC EXERCISES: CPT

## 2024-12-24 NOTE — PROGRESS NOTES
Dx: Generalized weakness (R53.1)           Authorized # of Visits:  12  Fall Risk: standard         Precautions: n/a             Subjective:   The patient reports he didn't sleep well last night, so he's feeling more tired today  Current Pain Ratin/10  Objective:   See flow sheet    Assessment:   The patient continues to tolerate exercises well without complaints of pain, but a little more shaky with movements today    Plan:   Continue PT to address soft tissue and joint restrictions and provide instruction in a progressive therapeutic exercise program with manual and verbal cueing for proper form and technique    Date: 2024  Tx#:  Date:  2024   Tx#: 3 Date:   2024  Tx#:  Date:   Tx#: 5/ Date:   Tx#: 6/ Date:   Tx#: 7/ Date:   Tx#: 8/   TherEx (40 min) TherEx (40 min) TherEx (40 min) TherEx TherEx TherEx TherEx   Nustep L4 x 10 min Nustep L5 x 10 min Nustep L5 x 10 min       Supine active HS stretch x 10 with 5 sec holds Standing on Airex with trunk/head rotation H/L clams green band 2 x 15       H/L clams green band 2 x 15 Standing marching 2 x 10 Supine horizontal abd green band 2 x 15       Sit to stand 2 x 10 Shuttle Bilateral 75# 3 x 15 Supine active HS stretch x 10 with 5 sec holds       Supine horizontal abd and diagonals green band 2 x 15 each H/L clams green band 2 x 15 Seated bilateral shoulder ER 2 x 15       Seated bilateral shoulder ER green band 2 x 15 Supine horizontal abd green band 2 x 15 Shuttle Bilateral 75# 3 x 15       Reviewed HEP Supine active HS stretch x 10 with 5 sec holds Shuttle R/L 50# x 15       - Reviewed HEP Reviewed HEP       - -        HEP: Sit to stand, Seated bilateral shoulder ER, Supine horizontal abd and diagonals with band, H/L clams with band    Charges: TherEx x 3       Total Timed Treatment: 40 min  Total Treatment Time: 40 min

## 2024-12-27 ENCOUNTER — OFFICE VISIT (OUTPATIENT)
Facility: LOCATION | Age: 64
End: 2024-12-27
Attending: PHYSICIAN ASSISTANT
Payer: MEDICARE

## 2024-12-27 PROCEDURE — 97110 THERAPEUTIC EXERCISES: CPT

## 2024-12-27 NOTE — PROGRESS NOTES
Dx: Generalized weakness (R53.1)           Authorized # of Visits:  12  Fall Risk: standard         Precautions: n/a             Subjective:   The patient reports he's been feeling more dizzy/shaky over the past couple of days  Current Pain Ratin/10  Objective:   See flow sheet    Assessment:   The patient tolerated exercises well, a little more slow/cautious with transitions/walking today    Plan:   Continue PT to address soft tissue and joint restrictions and provide instruction in a progressive therapeutic exercise program with manual and verbal cueing for proper form and technique    Date: 2024  Tx#:  Date:  2024   Tx#: 3 Date:   2024  Tx#:  Date:  2024   Tx#:  Date:   Tx#: 6/ Date:   Tx#: 7/ Date:   Tx#: 8/   TherEx (40 min) TherEx (40 min) TherEx (40 min) TherEx (40 min) TherEx TherEx TherEx   Nustep L4 x 10 min Nustep L5 x 10 min Nustep L5 x 10 min Nustep L5 x 10 min      Supine active HS stretch x 10 with 5 sec holds Standing on Airex with trunk/head rotation H/L clams green band 2 x 15 Shuttle Bilateral 75# 3 x 15      H/L clams green band 2 x 15 Standing marching 2 x 10 Supine horizontal abd green band 2 x 15 Shuttle R/L 50# x 15      Sit to stand 2 x 10 Shuttle Bilateral 75# 3 x 15 Supine active HS stretch x 10 with 5 sec holds Supine horizontal abd green band 2 x 15      Supine horizontal abd and diagonals green band 2 x 15 each H/L clams green band 2 x 15 Seated bilateral shoulder ER 2 x 15 H/L clams green band 2 x 15      Seated bilateral shoulder ER green band 2 x 15 Supine horizontal abd green band 2 x 15 Shuttle Bilateral 75# 3 x 15 Seated bilateral shoulder ER green band 2 x 15      Reviewed HEP Supine active HS stretch x 10 with 5 sec holds Shuttle R/L 50# x 15 Sit to stand x 10      - Reviewed HEP Reviewed HEP Seated HS stretch 2 x 30 sec      - -  Reviewed HEP      HEP: Sit to stand, Seated bilateral shoulder ER, Supine horizontal abd and diagonals with  band, H/L clams with band    Charges: TherEx x 3       Total Timed Treatment: 40 min  Total Treatment Time: 40 min

## 2024-12-30 ENCOUNTER — OFFICE VISIT (OUTPATIENT)
Facility: LOCATION | Age: 64
End: 2024-12-30
Attending: PHYSICIAN ASSISTANT
Payer: MEDICARE

## 2024-12-30 PROCEDURE — 97110 THERAPEUTIC EXERCISES: CPT

## 2024-12-30 NOTE — PROGRESS NOTES
Dx: Generalized weakness (R53.1)           Authorized # of Visits:  12  Fall Risk: standard         Precautions: n/a             Subjective:   The patient reports his right leg bothers him sometimes at night after days he has PT  Current Pain Ratin/10  Objective:   See flow sheet    Assessment:   The patient tolerated tolerated exercises well, felt shaky after several standing exercises in a row and wanted to sit down    Plan:   Progress strength, endurance and balance as tolerated    Date: 2024  Tx#:  Date:  2024   Tx#: 3 Date:   2024  Tx#:  Date:  2024   Tx#:  Date:   2024  Tx#:  Date:   Tx#:  Date:   Tx#: 8/   TherEx (40 min) TherEx (40 min) TherEx (40 min) TherEx (40 min) TherEx (40 min) TherEx TherEx   Nustep L4 x 10 min Nustep L5 x 10 min Nustep L5 x 10 min Nustep L5 x 10 min Nustep L5 x 10 min     Supine active HS stretch x 10 with 5 sec holds Standing on Airex with trunk/head rotation H/L clams green band 2 x 15 Shuttle Bilateral 75# 3 x 15 Shuttle Bilateral 75# 3 x 15     H/L clams green band 2 x 15 Standing marching 2 x 10 Supine horizontal abd green band 2 x 15 Shuttle R/L 50# x 15 Shuttle R/L 50# x 15     Sit to stand 2 x 10 Shuttle Bilateral 75# 3 x 15 Supine active HS stretch x 10 with 5 sec holds Supine horizontal abd green band 2 x 15 Standing shoulder extension with black band 2 x 15 with 5 sec holds     Supine horizontal abd and diagonals green band 2 x 15 each H/L clams green band 2 x 15 Seated bilateral shoulder ER 2 x 15 H/L clams green band 2 x 15 Sidestepping with green band to fatigue     Seated bilateral shoulder ER green band 2 x 15 Supine horizontal abd green band 2 x 15 Shuttle Bilateral 75# 3 x 15 Seated bilateral shoulder ER green band 2 x 15 Seated bilateral shoulder ER green band 2 x 15     Reviewed HEP Supine active HS stretch x 10 with 5 sec holds Shuttle R/L 50# x 15 Sit to stand x 10 Supine horizontal abd green band  x 15      - Reviewed HEP Reviewed HEP Seated HS stretch 2 x 30 sec H/L clams green band 2 x 15     - -  Reviewed HEP Reviewed HEP     HEP: Sit to stand, Seated bilateral shoulder ER, Supine horizontal abd and diagonals with band, H/L clams with band    Charges: TherEx x 3       Total Timed Treatment: 40 min  Total Treatment Time: 40 min

## 2025-01-02 ENCOUNTER — OFFICE VISIT (OUTPATIENT)
Facility: LOCATION | Age: 65
End: 2025-01-02
Attending: PHYSICIAN ASSISTANT
Payer: MEDICARE

## 2025-01-02 PROCEDURE — 97110 THERAPEUTIC EXERCISES: CPT

## 2025-01-02 NOTE — PROGRESS NOTES
Dx: Generalized weakness (R53.1)           Authorized # of Visits:  12  Fall Risk: standard         Precautions: n/a             Subjective:   The patient reports he had a couple of falls at home since the last visit  Current Pain Ratin/10  Objective:   See flow sheet    Assessment:   The patient tolerated exercises well with no significant reports of increased fatigue. He reports feeling shakier lately with standing/walking    Plan:   Progress strength, endurance and balance as tolerated    Date: 2024  Tx#:  Date:  2024   Tx#: 3/12 Date:   2024  Tx#:  Date:  2024   Tx#:  Date:   2024  Tx#:  Date:   2025  Tx#:  Date:   Tx#: 8/   TherEx (40 min) TherEx (40 min) TherEx (40 min) TherEx (40 min) TherEx (40 min) TherEx (40 min) TherEx   Nustep L4 x 10 min Nustep L5 x 10 min Nustep L5 x 10 min Nustep L5 x 10 min Nustep L5 x 10 min Nustep L5 x 12 min    Supine active HS stretch x 10 with 5 sec holds Standing on Airex with trunk/head rotation H/L clams green band 2 x 15 Shuttle Bilateral 75# 3 x 15 Shuttle Bilateral 75# 3 x 15 Shuttle Bilateral 75# 3 x 15    H/L clams green band 2 x 15 Standing marching 2 x 10 Supine horizontal abd green band 2 x 15 Shuttle R/L 50# x 15 Shuttle R/L 50# x 15 Shuttle R/L 50# 2 x 15    Sit to stand 2 x 10 Shuttle Bilateral 75# 3 x 15 Supine active HS stretch x 10 with 5 sec holds Supine horizontal abd green band 2 x 15 Standing shoulder extension with black band 2 x 15 with 5 sec holds H/L clams green band 2 x 15    Supine horizontal abd and diagonals green band 2 x 15 each H/L clams green band 2 x 15 Seated bilateral shoulder ER 2 x 15 H/L clams green band 2 x 15 Sidestepping with green band to fatigue Supine horizontal abd green band 2 x 15    Seated bilateral shoulder ER green band 2 x 15 Supine horizontal abd green band 2 x 15 Shuttle Bilateral 75# 3 x 15 Seated bilateral shoulder ER green band 2 x 15 Seated bilateral shoulder ER  green band 2 x 15 Seated bilateral shoulder ER green band 2 x 15    Reviewed HEP Supine active HS stretch x 10 with 5 sec holds Shuttle R/L 50# x 15 Sit to stand x 10 Supine horizontal abd green band  x 15 Sit to stand 2 x to fatigue    - Reviewed HEP Reviewed HEP Seated HS stretch 2 x 30 sec H/L clams green band 2 x 15 Reviewed HEP    - -  Reviewed HEP Reviewed HEP -    HEP: Sit to stand, Seated bilateral shoulder ER, Supine horizontal abd and diagonals with band, H/L clams with band    Charges: TherEx x 3       Total Timed Treatment: 40 min  Total Treatment Time: 40 min

## 2025-01-06 ENCOUNTER — TELEPHONE (OUTPATIENT)
Dept: PHYSICAL THERAPY | Facility: HOSPITAL | Age: 65
End: 2025-01-06

## 2025-01-15 ENCOUNTER — OFFICE VISIT (OUTPATIENT)
Facility: LOCATION | Age: 65
End: 2025-01-15
Attending: PHYSICIAN ASSISTANT
Payer: MEDICARE

## 2025-01-15 PROCEDURE — 97110 THERAPEUTIC EXERCISES: CPT

## 2025-01-15 NOTE — PROGRESS NOTES
Dx: Generalized weakness (R53.1)           Authorized # of Visits:  12  Fall Risk: standard         Precautions: n/a             Subjective:   The patient reports he's been feeling more shaky and unstable lately. He reports he has an appointment with a neurologist on Friday  Current Pain Ratin/10  Objective:   See flow sheet    Assessment:   Standing exercises limited due to patient feeling much more unstable and shaky    Plan:   Progress strength, endurance and balance as tolerated    Date: 2024  Tx#:  Date:  2024   Tx#: 3/12 Date:   2024  Tx#:  Date:  2024   Tx#:  Date:   2024  Tx#:  Date:   2025  Tx#:  Date:   1/15/2025  Tx#:    TherEx (40 min) TherEx (40 min) TherEx (40 min) TherEx (40 min) TherEx (40 min) TherEx (40 min) TherEx (40 min)   Nustep L4 x 10 min Nustep L5 x 10 min Nustep L5 x 10 min Nustep L5 x 10 min Nustep L5 x 10 min Nustep L5 x 12 min Nustep L5 x 12 min   Supine active HS stretch x 10 with 5 sec holds Standing on Airex with trunk/head rotation H/L clams green band 2 x 15 Shuttle Bilateral 75# 3 x 15 Shuttle Bilateral 75# 3 x 15 Shuttle Bilateral 75# 3 x 15 H/L clams green band 2 x 15   H/L clams green band 2 x 15 Standing marching 2 x 10 Supine horizontal abd green band 2 x 15 Shuttle R/L 50# x 15 Shuttle R/L 50# x 15 Shuttle R/L 50# 2 x 15 Supine horizontal abd green band 2 x 15   Sit to stand 2 x 10 Shuttle Bilateral 75# 3 x 15 Supine active HS stretch x 10 with 5 sec holds Supine horizontal abd green band 2 x 15 Standing shoulder extension with black band 2 x 15 with 5 sec holds H/L clams green band 2 x 15 Seated bilateral shoulder ER green band 2 x 15   Supine horizontal abd and diagonals green band 2 x 15 each H/L clams green band 2 x 15 Seated bilateral shoulder ER 2 x 15 H/L clams green band 2 x 15 Sidestepping with green band to fatigue Supine horizontal abd green band 2 x 15 Seated AAROM shoulder flexion with wand x 10    Seated bilateral shoulder ER green band 2 x 15 Supine horizontal abd green band 2 x 15 Shuttle Bilateral 75# 3 x 15 Seated bilateral shoulder ER green band 2 x 15 Seated bilateral shoulder ER green band 2 x 15 Seated bilateral shoulder ER green band 2 x 15 Sit to stand 2 x 10   Reviewed HEP Supine active HS stretch x 10 with 5 sec holds Shuttle R/L 50# x 15 Sit to stand x 10 Supine horizontal abd green band  x 15 Sit to stand 2 x to fatigue Seated LAQ x 10    - Reviewed HEP Reviewed HEP Seated HS stretch 2 x 30 sec H/L clams green band 2 x 15 Reviewed HEP Reviewed HEP   - -  Reviewed HEP Reviewed HEP - -   HEP: Sit to stand, Seated bilateral shoulder ER, Supine horizontal abd and diagonals with band, H/L clams with band    Charges: TherEx x 3       Total Timed Treatment: 40 min  Total Treatment Time: 40 min

## 2025-01-17 ENCOUNTER — LAB ENCOUNTER (OUTPATIENT)
Dept: LAB | Age: 65
End: 2025-01-17
Attending: Other
Payer: MEDICARE

## 2025-01-17 DIAGNOSIS — R25.1 TREMOR OF BOTH HANDS: ICD-10-CM

## 2025-01-17 DIAGNOSIS — F10.20 GAIT DISORDER, ALCOHOLIC (HCC): ICD-10-CM

## 2025-01-17 DIAGNOSIS — R26.9 GAIT DISORDER, ALCOHOLIC (HCC): ICD-10-CM

## 2025-01-17 LAB — VIT B12 SERPL-MCNC: 948 PG/ML (ref 211–911)

## 2025-01-17 PROCEDURE — 84425 ASSAY OF VITAMIN B-1: CPT

## 2025-01-17 PROCEDURE — 36415 COLL VENOUS BLD VENIPUNCTURE: CPT

## 2025-01-17 PROCEDURE — 82607 VITAMIN B-12: CPT

## 2025-01-20 ENCOUNTER — TELEPHONE (OUTPATIENT)
Dept: NEUROLOGY | Facility: CLINIC | Age: 65
End: 2025-01-20

## 2025-01-21 ENCOUNTER — OFFICE VISIT (OUTPATIENT)
Facility: LOCATION | Age: 65
End: 2025-01-21
Attending: PHYSICIAN ASSISTANT
Payer: MEDICARE

## 2025-01-21 PROCEDURE — 97110 THERAPEUTIC EXERCISES: CPT

## 2025-01-21 NOTE — PROGRESS NOTES
Dx: Generalized weakness (R53.1)           Authorized # of Visits:  12  Fall Risk: standard         Precautions: n/a             Subjective:   The patient reports he had a fall in his bathroom at home recently trying to sit down on the toilet  Current Pain Ratin/10  Objective:   See flow sheet    Assessment:   The patient continues to have a limited tolerance for progression of exercises due to unsteadiness and fatigue with exercises    Plan:   Progress strength, endurance and balance as tolerated    Date:  2024   Tx#:  Date:   2024  Tx#:  Date:   2025  Tx#:  Date:   1/15/2025  Tx#:  Date:  2025  Tx#:    TherEx (40 min) TherEx (40 min) TherEx (40 min) TherEx (40 min) TherEx (40 min)   Nustep L5 x 10 min Nustep L5 x 10 min Nustep L5 x 12 min Nustep L5 x 12 min Nustep L5 x 12 min   Shuttle Bilateral 75# 3 x 15 Shuttle Bilateral 75# 3 x 15 Shuttle Bilateral 75# 3 x 15 H/L clams green band 2 x 15 Shuttle Bilateral 75# 3 x 15   Shuttle R/L 50# x 15 Shuttle R/L 50# x 15 Shuttle R/L 50# 2 x 15 Supine horizontal abd green band 2 x 15 Slantboard gastroc stretch 3 x 30 sec   Supine horizontal abd green band 2 x 15 Standing shoulder extension with black band 2 x 15 with 5 sec holds H/L clams green band 2 x 15 Seated bilateral shoulder ER green band 2 x 15 Supine horizontal abd blue band 2 x 10   H/L clams green band 2 x 15 Sidestepping with green band to fatigue Supine horizontal abd green band 2 x 15 Seated AAROM shoulder flexion with wand x 10 H/L clams blue band 2 x 15   Seated bilateral shoulder ER green band 2 x 15 Seated bilateral shoulder ER green band 2 x 15 Seated bilateral shoulder ER green band 2 x 15 Sit to stand 2 x 10 Reviewed HEP   Sit to stand x 10 Supine horizontal abd green band  x 15 Sit to stand 2 x to fatigue Seated LAQ x 10  -   Seated HS stretch 2 x 30 sec H/L clams green band 2 x 15 Reviewed HEP Reviewed HEP -   Reviewed HEP Reviewed HEP - - -   HEP: Sit to  stand, Seated bilateral shoulder ER, Supine horizontal abd and diagonals with band, H/L clams with band    Charges: TherEx x 3       Total Timed Treatment: 40 min  Total Treatment Time: 40 min

## 2025-01-22 LAB — VITAMIN B1 WHOLE BLD: 142.9 NMOL/L

## 2025-01-23 ENCOUNTER — TELEPHONE (OUTPATIENT)
Dept: NEUROLOGY | Facility: CLINIC | Age: 65
End: 2025-01-23

## 2025-01-23 ENCOUNTER — APPOINTMENT (OUTPATIENT)
Facility: LOCATION | Age: 65
End: 2025-01-23
Attending: PHYSICIAN ASSISTANT
Payer: MEDICARE

## 2025-01-28 ENCOUNTER — OFFICE VISIT (OUTPATIENT)
Facility: LOCATION | Age: 65
End: 2025-01-28
Attending: PHYSICIAN ASSISTANT
Payer: MEDICARE

## 2025-01-28 PROCEDURE — 97110 THERAPEUTIC EXERCISES: CPT

## 2025-01-28 NOTE — PROGRESS NOTES
Dx: Generalized weakness (R53.1)           Authorized # of Visits:  12  Fall Risk: standard         Precautions: n/a             Subjective:   The patient reports he's been feeling a little better this week  Current Pain Ratin/10  Objective:   Physical Exam:  Posture/Observation: The patient continues to sit with poor posture with a slightly forward head and rounded shoudlers     LE Strength: Hip flexors 5/5, Quadriceps 5/5, Hamstrings 5/5, Ankle dorsiflexors 5/5  LE Flexibility: Severe limitations in bilateral hamstrings, Moderate limitations in bilateral piriformis                Postural Control:  4-Stage Balance Test:               - Feet together: >30 sec               - Modified Tandem:  >30 sec               - Tandem Stance: >6 sec           Fall Risk: Yes               - SLS: R 1 sec, L 1 sec                Fall Risk: yes  [Full tandem stance <10 sec indicates increased risk of falling]  Age appropriate norms for SLS: 60-70 y/o mean = 27.0 sec                                                      70-78 y/o mean = 17.2 sec                                                      80-98 y/o mean = 8.5 sec      Functional Mobility:  30 sec sit<>stand: 5                                 Fall Risk: yes  [Below average score indicates high risk for falls; norms by age > or = to...               60-63 y/o Men: 14, Women: 12               65-70 y/o Men: 12, Women: 11               70-75 y/o Men: 12, Women: 10               75-78 y/o Men: 11, Women: 10               80-83 y/o Men: 10, Women: 9               85-90 y/o Men: 8, Women: 8               90-93 y/o Men 7, Women: 4]     Gait The patient continues to ambulate with his eyes looking down at the floor and decreased step length and push off bilaterally      TUG (AD, time): 26 sec                            Fall Risk: yes  [Performance exceeding the upper limit of confidence intervals are considered increased risk for falls; Thomas, 2006...               60-70 y/o  mean 8.1s (7.1-9.0s)               70-78 y/o mean 9.2s (8.2-10.2s)               80-98 y/o mean 11.3s (10.0-12.7s)]    Goals: ( To be met in 12 visits)  Pt will demonstrate improved SLS to >10 seconds STEFANI to promote safety and decrease risk of falls on uneven surfaces such as grass Not met  Pt will perform TUG in <10 seconds with least restrictive AD, demonstrating improved gait speed for improved participation in ADL such as community ambulation Not met  Pt will be able to squat to  light objects around the house without loss of stability Not met  Pt will improve functional hip strength to demonstrate ability to ascend/descend 1 flight of stairs reciprocally without use of handrail Not met  Pt will ambulate community distances of at least 2000 feet to facilitate participation in community activities Not met  Pt will be independent and compliant with comprehensive HEP to maintain progress achieved in PT Progressing towards    Assessment:   The patient has demonstrated worsening mobility and balance over the past month.     Plan:   Continue PT 2 additional visits to progress HEP as able     Date:  12/27/2024   Tx#: 5/12 Date:   12/30/2024  Tx#: 6/12 Date:   1/2/2025  Tx#: 7/12 Date:   1/15/2025  Tx#: 8/12 Date:  1/21/2025  Tx#: 9/12 Date:  1/28/2025  Tx#: 10/12   TherEx (40 min) TherEx (40 min) TherEx (40 min) TherEx (40 min) TherEx (40 min) TherEx (40 min)   Nustep L5 x 10 min Nustep L5 x 10 min Nustep L5 x 12 min Nustep L5 x 12 min Nustep L5 x 12 min Nustep L5 x 12 min   Shuttle Bilateral 75# 3 x 15 Shuttle Bilateral 75# 3 x 15 Shuttle Bilateral 75# 3 x 15 H/L clams green band 2 x 15 Shuttle Bilateral 75# 3 x 15 Shuttle Bilateral 75# 3 x 15   Shuttle R/L 50# x 15 Shuttle R/L 50# x 15 Shuttle R/L 50# 2 x 15 Supine horizontal abd green band 2 x 15 Slantboard gastroc stretch 3 x 30 sec H/L clams blue band 2 x 15   Supine horizontal abd green band 2 x 15 Standing shoulder extension with black band 2 x 15 with 5  sec holds H/L clams green band 2 x 15 Seated bilateral shoulder ER green band 2 x 15 Supine horizontal abd blue band 2 x 10 Seated AAROM shoulder flexion with wand in unsupported sitting   H/L clams green band 2 x 15 Sidestepping with green band to fatigue Supine horizontal abd green band 2 x 15 Seated AAROM shoulder flexion with wand x 10 H/L clams blue band 2 x 15 Slantboard gastroc stretch 3 x 30 sec   Seated bilateral shoulder ER green band 2 x 15 Seated bilateral shoulder ER green band 2 x 15 Seated bilateral shoulder ER green band 2 x 15 Sit to stand 2 x 10 Reviewed HEP Re-assessment of mobility and balance   Sit to stand x 10 Supine horizontal abd green band  x 15 Sit to stand 2 x to fatigue Seated LAQ x 10  - Reviewed HEP   Seated HS stretch 2 x 30 sec H/L clams green band 2 x 15 Reviewed HEP Reviewed HEP - -   Reviewed HEP Reviewed HEP - - - -   HEP: Sit to stand, Seated bilateral shoulder ER, Supine horizontal abd and diagonals with band, H/L clams with band    Charges: TherEx x 3       Total Timed Treatment: 40 min  Total Treatment Time: 40 min

## 2025-01-30 ENCOUNTER — OFFICE VISIT (OUTPATIENT)
Facility: LOCATION | Age: 65
End: 2025-01-30
Attending: PHYSICIAN ASSISTANT
Payer: MEDICARE

## 2025-01-30 PROCEDURE — 97110 THERAPEUTIC EXERCISES: CPT

## 2025-01-30 NOTE — PROGRESS NOTES
Dx: Generalized weakness (R53.1)           Authorized # of Visits:  12  Fall Risk: standard         Precautions: n/a             Subjective:   The patient reports he feels better but still shaky  Current Pain Ratin/10  Objective:   See flow sheet    Assessment:   The patient tolerated treatment well with no pain, but has limited mobility and tolerance for progression of exercises    Plan:   Continue PT 1 additional visit    Date:   2025  Tx#:  Date:   1/15/2025  Tx#:  Date:  2025  Tx#:  Date:  2025  Tx#: 10/12 Date:  2025  Tx#:    TherEx (40 min) TherEx (40 min) TherEx (40 min) TherEx (40 min) TherEx (40 min)   Nustep L5 x 12 min Nustep L5 x 12 min Nustep L5 x 12 min Nustep L5 x 12 min Nustep L5 x 12 min   Shuttle Bilateral 75# 3 x 15 H/L clams green band 2 x 15 Shuttle Bilateral 75# 3 x 15 Shuttle Bilateral 75# 3 x 15 Unsupported sitting shoulder flexion with wand x 10   Shuttle R/L 50# 2 x 15 Supine horizontal abd green band 2 x 15 Slantboard gastroc stretch 3 x 30 sec H/L clams blue band 2 x 15 Supine horizontal abd blue band 2 x 10   H/L clams green band 2 x 15 Seated bilateral shoulder ER green band 2 x 15 Supine horizontal abd blue band 2 x 10 Seated AAROM shoulder flexion with wand in unsupported sitting Shuttle Bilateral 75# 3 x 15   Supine horizontal abd green band 2 x 15 Seated AAROM shoulder flexion with wand x 10 H/L clams blue band 2 x 15 Slantboard gastroc stretch 3 x 30 sec Sit to stand 2 x 10   Seated bilateral shoulder ER green band 2 x 15 Sit to stand 2 x 10 Reviewed HEP Re-assessment of mobility and balance H/L clams blue band 2 x 15   Sit to stand 2 x to fatigue Seated LAQ x 10  - Reviewed HEP Reviewed HEP   Reviewed HEP Reviewed HEP - - -   - - - - -   HEP: Sit to stand, Seated bilateral shoulder ER, Supine horizontal abd and diagonals with band, H/L clams with band    Charges: TherEx x 3       Total Timed Treatment: 40 min  Total Treatment Time: 40  min

## 2025-02-06 ENCOUNTER — OFFICE VISIT (OUTPATIENT)
Facility: LOCATION | Age: 65
End: 2025-02-06
Attending: PHYSICIAN ASSISTANT
Payer: MEDICARE

## 2025-02-06 PROCEDURE — 97110 THERAPEUTIC EXERCISES: CPT

## 2025-02-06 NOTE — PROGRESS NOTES
Dx: Generalized weakness (R53.1)           Authorized # of Visits:  12  Fall Risk: standard         Precautions: n/a             Subjective:   The patient reports he's been feeling better over the past couple of days.  Current Pain Ratin/10  Objective:   Physical Exam:  Posture/Observation: The patient continues to sit with poor posture with a slightly forward head and rounded shoudlers     LE Strength: Hip flexors 5/5, Quadriceps 5/5, Hamstrings 5/5, Ankle dorsiflexors 5/5  LE Flexibility: Severe limitations in bilateral hamstrings, Moderate limitations in bilateral piriformis                Postural Control:  4-Stage Balance Test:               - Feet together: >30 sec               - Modified Tandem:  >30 sec               - Tandem Stance: >6 sec            Fall Risk: Yes               - SLS: R 1 sec, L 1 sec                Fall Risk: yes  [Full tandem stance <10 sec indicates increased risk of falling]  Age appropriate norms for SLS: 60-70 y/o mean = 27.0 sec                                                      70-78 y/o mean = 17.2 sec                                                      80-98 y/o mean = 8.5 sec      Functional Mobility:  30 sec sit<>stand: 5                                 Fall Risk: yes  [Below average score indicates high risk for falls; norms by age > or = to...               60-65 y/o Men: 14, Women: 12               65-70 y/o Men: 12, Women: 11               70-75 y/o Men: 12, Women: 10               75-78 y/o Men: 11, Women: 10               80-83 y/o Men: 10, Women: 9               85-90 y/o Men: 8, Women: 8               90-95 y/o Men 7, Women: 4]     Gait The patient continues to ambulate with his eyes looking down at the floor and decreased step length and push off bilaterally      TUG (AD, time): 26 sec                            Fall Risk: yes  [Performance exceeding the upper limit of confidence intervals are considered increased risk for falls; Thomas, 2006...                60-70 y/o mean 8.1s (7.1-9.0s)               70-80 y/o mean 9.2s (8.2-10.2s)               80-98 y/o mean 11.3s (10.0-12.7s)]     Goals: ( To be met in 12 visits)  Pt will demonstrate improved SLS to >10 seconds STEFANI to promote safety and decrease risk of falls on uneven surfaces such as grass Not met  Pt will perform TUG in <10 seconds with least restrictive AD, demonstrating improved gait speed for improved participation in ADL such as community ambulation Not met  Pt will be able to squat to  light objects around the house without loss of stability Not met  Pt will improve functional hip strength to demonstrate ability to ascend/descend 1 flight of stairs reciprocally without use of handrail Not met  Pt will ambulate community distances of at least 2000 feet to facilitate participation in community activities Not met  Pt will be independent and compliant with comprehensive HEP to maintain progress achieved in PT Met    Assessment:   The patient's functional mobility and balance have worsened somewhat over the past month with fluctuations in his medical condition. He has been given a home exercise program and has demonstrated/verbalized a good understanding of the exercises    Plan:   The patient will be discharged from outpatient physical therapy and will continue independently with a home exercise program    Date:  1/21/2025  Tx#: 9/12 Date:  1/28/2025  Tx#: 10/12 Date:  1/30/2025  Tx#: 11/12 Date:  2/6/2025  Tx#: 12/12   TherEx (40 min) TherEx (40 min) TherEx (40 min) TherEx (40 min)   Nustep L5 x 12 min Nustep L5 x 12 min Nustep L5 x 12 min Nustep L5 x 12 min   Shuttle Bilateral 75# 3 x 15 Shuttle Bilateral 75# 3 x 15 Unsupported sitting shoulder flexion with wand x 10 Shuttle Bilateral 100# 3 x 15   Slantboard gastroc stretch 3 x 30 sec H/L clams blue band 2 x 15 Supine horizontal abd blue band 2 x 10 H/L clams blue band 2 x 15   Supine horizontal abd blue band 2 x 10 Seated AAROM shoulder flexion with  wand in unsupported sitting Shuttle Bilateral 75# 3 x 15 Supine horizontal abd with blue band 2 x 15   H/L clams blue band 2 x 15 Slantboard gastroc stretch 3 x 30 sec Sit to stand 2 x 10 Slantboard gastroc stretch 3 x 30 sec   Reviewed HEP Re-assessment of mobility and balance H/L clams blue band 2 x 15 Supine active HS stretch x 10 with 5 sec holds   - Reviewed HEP Reviewed HEP Reviewed HEP   - - - -   - - - -   HEP: Sit to stand, Seated bilateral shoulder ER, Supine horizontal abd and diagonals with band, H/L clams with band    Charges: TherEx x 3       Total Timed Treatment: 40 min  Total Treatment Time: 40 min

## 2025-02-11 ENCOUNTER — HOSPITAL ENCOUNTER (OUTPATIENT)
Dept: MRI IMAGING | Age: 65
Discharge: HOME | End: 2025-02-11
Attending: Other
Payer: MEDICARE

## 2025-02-11 DIAGNOSIS — R25.1 TREMOR OF BOTH HANDS: ICD-10-CM

## 2025-02-11 DIAGNOSIS — R26.9 GAIT DISORDER, ALCOHOLIC (HCC): ICD-10-CM

## 2025-02-11 DIAGNOSIS — F10.20 GAIT DISORDER, ALCOHOLIC (HCC): ICD-10-CM

## 2025-02-11 PROCEDURE — 70551 MRI BRAIN STEM W/O DYE: CPT | Performed by: OTHER

## 2025-03-08 ENCOUNTER — PATIENT MESSAGE (OUTPATIENT)
Dept: FAMILY MEDICINE CLINIC | Facility: CLINIC | Age: 65
End: 2025-03-08

## 2025-04-10 ENCOUNTER — LAB ENCOUNTER (OUTPATIENT)
Dept: LAB | Age: 65
End: 2025-04-10
Attending: INTERNAL MEDICINE
Payer: MEDICARE

## 2025-04-10 DIAGNOSIS — K70.31 ALCOHOLIC CIRRHOSIS OF LIVER WITH ASCITES (HCC): ICD-10-CM

## 2025-04-10 LAB
ALBUMIN SERPL-MCNC: 3.5 G/DL (ref 3.2–4.8)
ALBUMIN/GLOB SERPL: 1 {RATIO} (ref 1–2)
ALP LIVER SERPL-CCNC: 89 U/L (ref 45–117)
ALT SERPL-CCNC: 28 U/L (ref 10–49)
ANION GAP SERPL CALC-SCNC: 8 MMOL/L (ref 0–18)
AST SERPL-CCNC: 47 U/L (ref ?–34)
BILIRUB SERPL-MCNC: 2.8 MG/DL (ref 0.2–1.1)
BUN BLD-MCNC: 24 MG/DL (ref 9–23)
CALCIUM BLD-MCNC: 9.3 MG/DL (ref 8.7–10.6)
CHLORIDE SERPL-SCNC: 104 MMOL/L (ref 98–112)
CO2 SERPL-SCNC: 26 MMOL/L (ref 21–32)
CREAT BLD-MCNC: 1.29 MG/DL (ref 0.7–1.3)
EGFRCR SERPLBLD CKD-EPI 2021: 62 ML/MIN/1.73M2 (ref 60–?)
ERYTHROCYTE [DISTWIDTH] IN BLOOD BY AUTOMATED COUNT: 14.2 %
FASTING STATUS PATIENT QL REPORTED: NO
GLOBULIN PLAS-MCNC: 3.6 G/DL (ref 2–3.5)
GLUCOSE BLD-MCNC: 105 MG/DL (ref 70–99)
HCT VFR BLD AUTO: 34 % (ref 39–53)
HGB BLD-MCNC: 11.4 G/DL (ref 13–17.5)
INR BLD: 1.52 (ref 0.8–1.2)
MCH RBC QN AUTO: 34.8 PG (ref 26–34)
MCHC RBC AUTO-ENTMCNC: 33.5 G/DL (ref 31–37)
MCV RBC AUTO: 103.7 FL (ref 80–100)
OSMOLALITY SERPL CALC.SUM OF ELEC: 290 MOSM/KG (ref 275–295)
PLATELET # BLD AUTO: 169 10(3)UL (ref 150–450)
POTASSIUM SERPL-SCNC: 3.6 MMOL/L (ref 3.5–5.1)
PROT SERPL-MCNC: 7.1 G/DL (ref 5.7–8.2)
PROTHROMBIN TIME: 18.5 SECONDS (ref 11.6–14.8)
RBC # BLD AUTO: 3.28 X10(6)UL (ref 3.8–5.8)
SODIUM SERPL-SCNC: 138 MMOL/L (ref 136–145)
WBC # BLD AUTO: 6.5 X10(3) UL (ref 4–11)

## 2025-04-10 PROCEDURE — 85027 COMPLETE CBC AUTOMATED: CPT

## 2025-04-10 PROCEDURE — 80053 COMPREHEN METABOLIC PANEL: CPT

## 2025-04-10 PROCEDURE — 85610 PROTHROMBIN TIME: CPT

## 2025-04-10 PROCEDURE — 36415 COLL VENOUS BLD VENIPUNCTURE: CPT

## 2025-05-08 RX ORDER — CIPROFLOXACIN 500 MG/1
500 TABLET, FILM COATED ORAL DAILY
COMMUNITY

## 2025-05-08 RX ORDER — MELATONIN
1000 DAILY
COMMUNITY

## 2025-05-15 ENCOUNTER — ANESTHESIA EVENT (OUTPATIENT)
Dept: ENDOSCOPY | Facility: HOSPITAL | Age: 65
End: 2025-05-15
Payer: MEDICARE

## 2025-05-15 NOTE — ANESTHESIA PREPROCEDURE EVALUATION
PRE-OP EVALUATION    Patient Name: Brody Bowden    Admit Diagnosis: Alcoholic cirrhosis of liver with ascites (HCC) [K70.31]    Pre-op Diagnosis: Alcoholic cirrhosis of liver with ascites (HCC) [K70.31]    ESOPHAGOGASTRODUODENOSCOPY (EGD)    Anesthesia Procedure: ESOPHAGOGASTRODUODENOSCOPY (EGD)    Surgeons and Role:     * Saurabh Tom MD - Primary    Pre-op vitals reviewed.  Temp: 97 °F (36.1 °C)  Pulse: 74  Resp: 18  BP: 107/69  SpO2: 100 %  Body mass index is 23.06 kg/m².    Current medications reviewed.  Hospital Medications:  Current Medications[1]    Outpatient Medications:   Prescriptions Prior to Admission[2]    Allergies: Ace inhibitors, Amlodipine, and Naproxen      Anesthesia Evaluation    Patient summary reviewed.    Anesthetic Complications  (-) history of anesthetic complications         GI/Hepatic/Renal  Comment: Cirrhosis              (+) liver disease                 Cardiovascular  Comment: Patient denies hx of chest pain/tightness, MI, or cardiac disease.      ECHO:  CONCLUSIONS:   -Two-dimensional transthoracic echocardiography was performed using standard views & projections with M-mode and Doppler (continuous, pulsed wave, spectral & color flow). Strain imaging was performed.   -The left ventricle is normal in size. There is mild concentric left ventricular hypertrophy. Left ventricular systolic function is normal. EF = 73% (2D biplane) All scored segments are normal. Global LV myocardial strain is normal at -21.43 %. Left ventricular diastolic function is normal.   -The right ventricle is normal in size. Right ventricular systolic function is normal. RV free wall longitudinal strain is normal at -31.15 %.   -The left atrial size is moderately enlarged.   -The right atrial cavity is dilated.   -There is trivial aortic valve regurgitation that is due to sclerosis.     -There is no patent foramen ovale detected by saline contrast, saline contrast with valsalva and saline contrast with cough.    -There is no pericardial effusion. Ascites is present.     -No prior echocardiographic exam available for comparison.     Electronically signed by Dr.Patricia Theodore on 3/27/2025 at 5:08:17 PM.         Exercise tolerance: good     MET: >4      (+) hypertension                       (-) angina     (-) MEEK         Endo/Other      (+) diabetes  type 2,                          Pulmonary      (-) asthma         (-) shortness of breath     (+) sleep apnea and no treatment      Neuro/Psych                                      Past Surgical History[3]  Social Hx on file[4]  History   Drug Use No     Available pre-op labs reviewed.  Lab Results   Component Value Date    WBC 6.5 04/10/2025    RBC 3.28 (L) 04/10/2025    HGB 11.4 (L) 04/10/2025    HCT 34.0 (L) 04/10/2025    .7 (H) 04/10/2025    MCH 34.8 (H) 04/10/2025    MCHC 33.5 04/10/2025    RDW 14.2 04/10/2025    .0 04/10/2025     Lab Results   Component Value Date     04/10/2025    K 3.6 04/10/2025     04/10/2025    CO2 26.0 04/10/2025    BUN 24 (H) 04/10/2025    CREATSERUM 1.29 04/10/2025     (H) 04/10/2025    CA 9.3 04/10/2025            Airway      Mallampati: II  Mouth opening: >3 FB  TM distance: 4 - 6 cm  Neck ROM: full Cardiovascular    Cardiovascular exam normal.  Rhythm: regular  Rate: normal     Dental    Dentition appears grossly intact         Pulmonary    Pulmonary exam normal.  Breath sounds clear to auscultation bilaterally.               Other findings              ASA: 3   Plan: MAC  NPO status verified and patient meets guidelines.    Post-procedure pain management plan discussed with surgeon and patient.      Plan/risks discussed with: patient                Present on Admission:  **None**             [1]    glucose (Dex4) 15 GM/59ML oral liquid 15 g  15 g Oral Q15 Min PRN    Or    glucose (Glutose) 40% oral gel 15 g  15 g Oral Q15 Min PRN    Or    glucose-vitamin C (Dex-4) chewable tab 4 tablet  4 tablet Oral Q15  Min PRN    Or    dextrose 50% injection 50 mL  50 mL Intravenous Q15 Min PRN    Or    glucose (Dex4) 15 GM/59ML oral liquid 30 g  30 g Oral Q15 Min PRN    Or    glucose (Glutose) 40% oral gel 30 g  30 g Oral Q15 Min PRN    Or    glucose-vitamin C (Dex-4) chewable tab 8 tablet  8 tablet Oral Q15 Min PRN    lactated ringers infusion   Intravenous Continuous   [2]   Medications Prior to Admission   Medication Sig Dispense Refill Last Dose/Taking    cyanocobalamin 1000 MCG Oral Tab Take 1 tablet (1,000 mcg total) by mouth daily.   5/15/2025    ciprofloxacin 500 MG Oral Tab Take 1 tablet (500 mg total) by mouth daily.   5/15/2025    eplerenone 50 MG Oral Tab Take 1 tablet (50 mg total) by mouth daily.   5/15/2025    rifAXIMin (XIFAXAN) 550 MG Oral Tab Take 1 tablet (550 mg total) by mouth 2 (two) times daily. 36 tablet 0 5/15/2025    lactulose 10 GM/15ML Oral Solution Take 45 mL (30 g total) by mouth in the morning and 45 mL (30 g total) in the evening and 45 mL (30 g total) before bedtime.   5/15/2025    furosemide 20 MG Oral Tab Take 1 tablet (20 mg total) by mouth daily. 90 tablet 0 5/15/2025    Cholecalciferol 125 MCG (5000 UT) Oral Tab Take 1 tablet (5,000 Units total) by mouth 3 (three) times a week.   5/15/2025    pantoprazole 40 MG Oral Tab EC Take 1 tablet (40 mg total) by mouth 2 (two) times daily before meals. 180 tablet 0 5/15/2025    simvastatin 40 MG Oral Tab Take 1 tablet (40 mg total) by mouth nightly. 90 tablet 3 5/15/2025    sertraline 50 MG Oral Tab 1/2 tab daily x 6 days then 1 tab daily 90 tablet 3 5/15/2025    triamcinolone 0.1 % External Cream Apply topically 2 (two) times daily as needed. Up to one week and one week break before restarting the cycle if needed 60 g 3 5/15/2025   [3]   Past Surgical History:  Procedure Laterality Date    Abd paracentesis  10/15/2024    Colonoscopy      Kidney surgery      left nephrectomy, due to injury from football as a child    Knee replacement surgery      Knee  surgery      Other  10/2011    card cath post proc, no stents     Repair cruciate ligament,knee  01/01/1998    right x4    Total knee replacement     [4]   Social History  Socioeconomic History    Marital status:    Tobacco Use    Smoking status: Never    Smokeless tobacco: Former    Tobacco comments:     Chews tobacco daily   Vaping Use    Vaping status: Never Used   Substance and Sexual Activity    Alcohol use: Not Currently    Drug use: No   Other Topics Concern    Caffeine Concern Yes     Comment: Coffee.Coke    Exercise Yes

## 2025-05-16 ENCOUNTER — ANESTHESIA (OUTPATIENT)
Dept: ENDOSCOPY | Facility: HOSPITAL | Age: 65
End: 2025-05-16
Payer: MEDICARE

## 2025-05-16 ENCOUNTER — HOSPITAL ENCOUNTER (OUTPATIENT)
Facility: HOSPITAL | Age: 65
Setting detail: HOSPITAL OUTPATIENT SURGERY
Discharge: HOME | End: 2025-05-16
Attending: INTERNAL MEDICINE | Admitting: INTERNAL MEDICINE
Payer: MEDICARE

## 2025-05-16 VITALS
SYSTOLIC BLOOD PRESSURE: 104 MMHG | WEIGHT: 170 LBS | HEIGHT: 72 IN | TEMPERATURE: 97 F | DIASTOLIC BLOOD PRESSURE: 64 MMHG | BODY MASS INDEX: 23.03 KG/M2 | HEART RATE: 72 BPM | OXYGEN SATURATION: 100 % | RESPIRATION RATE: 16 BRPM

## 2025-05-16 DIAGNOSIS — K70.31 ALCOHOLIC CIRRHOSIS OF LIVER WITH ASCITES (HCC): ICD-10-CM

## 2025-05-16 LAB — GLUCOSE BLD-MCNC: 95 MG/DL (ref 70–99)

## 2025-05-16 PROCEDURE — 82962 GLUCOSE BLOOD TEST: CPT

## 2025-05-16 PROCEDURE — 88305 TISSUE EXAM BY PATHOLOGIST: CPT | Performed by: INTERNAL MEDICINE

## 2025-05-16 RX ORDER — SODIUM CHLORIDE, SODIUM LACTATE, POTASSIUM CHLORIDE, CALCIUM CHLORIDE 600; 310; 30; 20 MG/100ML; MG/100ML; MG/100ML; MG/100ML
INJECTION, SOLUTION INTRAVENOUS CONTINUOUS
Status: DISCONTINUED | OUTPATIENT
Start: 2025-05-16 | End: 2025-05-16

## 2025-05-16 RX ORDER — NICOTINE POLACRILEX 4 MG
15 LOZENGE BUCCAL
Status: DISCONTINUED | OUTPATIENT
Start: 2025-05-16 | End: 2025-05-16

## 2025-05-16 RX ORDER — DEXTROSE MONOHYDRATE 25 G/50ML
50 INJECTION, SOLUTION INTRAVENOUS
Status: DISCONTINUED | OUTPATIENT
Start: 2025-05-16 | End: 2025-05-16

## 2025-05-16 RX ORDER — NALOXONE HYDROCHLORIDE 0.4 MG/ML
0.08 INJECTION, SOLUTION INTRAMUSCULAR; INTRAVENOUS; SUBCUTANEOUS AS NEEDED
Status: DISCONTINUED | OUTPATIENT
Start: 2025-05-16 | End: 2025-05-16

## 2025-05-16 RX ORDER — ONDANSETRON 2 MG/ML
4 INJECTION INTRAMUSCULAR; INTRAVENOUS EVERY 6 HOURS PRN
Status: DISCONTINUED | OUTPATIENT
Start: 2025-05-16 | End: 2025-05-16

## 2025-05-16 RX ORDER — NICOTINE POLACRILEX 4 MG
30 LOZENGE BUCCAL
Status: DISCONTINUED | OUTPATIENT
Start: 2025-05-16 | End: 2025-05-16

## 2025-05-16 NOTE — ANESTHESIA POSTPROCEDURE EVALUATION
[facility]    Brody Bowden Patient Status:  Hospital Outpatient Surgery   Age/Gender 64 year old male MRN [id number]   Location [facility] Attending Saurabh Tom MD   Hosp Day # 0 PCP Tiarra Lawson DO       Anesthesia Post-op Note    ESOPHAGOGASTRODUODENOSCOPY (EGD) with biopsies    Procedure Summary       Date: 05/16/25 Room / Location:  ENDOSCOPY 02 /  ENDOSCOPY    Anesthesia Start: 0701 Anesthesia Stop: 0717    Procedure: ESOPHAGOGASTRODUODENOSCOPY (EGD) with biopsies Diagnosis:       Alcoholic cirrhosis of liver with ascites (HCC)      (antrum nodule, duodenal nodule, esophageal varacies)    Surgeons: Saurabh Tom MD Anesthesiologist: Renato Villa DO    Anesthesia Type: MAC ASA Status: 3            Anesthesia Type: MAC    Vitals Value Taken Time   /65 05/16/25 07:18   Temp  05/16/25 07:18   Pulse 68 05/16/25 07:18   Resp 18 05/16/25 07:18   SpO2 100 % 05/16/25 07:18   Vitals shown include unfiled device data.        Patient Location: Endoscopy    Anesthesia Type: MAC    Airway Patency: patent    Postop Pain Control: adequate    Mental Status: mildly sedated but able to meaningfully participate in the post-anesthesia evaluation    Nausea/Vomiting: none    Cardiopulmonary/Hydration status: stable euvolemic    Complications: no apparent anesthesia related complications    Postop vital signs: stable    Dental Exam: Unchanged from Preop    Patient to be discharged from PACU when criteria met.

## 2025-05-16 NOTE — DISCHARGE INSTRUCTIONS

## 2025-05-16 NOTE — H&P
History & Physical Examination    Patient Name: Brody Bowden  MRN: [id number]  CSN: [id number]  YOB: 1960    Diagnosis: cirrhosis, EV    Present Illness: 63 y/o M history of EV presents for EV surveillance    Prescriptions Prior to Admission[1]  Current Hospital Medications[2]    Allergies: Allergies[3]    Past Medical History[4]  Past Surgical History[5]  Family History[6]  Social History     Tobacco Use    Smoking status: Never    Smokeless tobacco: Former    Tobacco comments:     Chews tobacco daily   Substance Use Topics    Alcohol use: Not Currently       SYSTEM Check if Review is Normal Check if Physical Exam is Normal If not normal, please explain:   HEENT [ x] [x ]    NECK & BACK [ x] [ x]    HEART [ x] [x ]    LUNGS [ x] [ x]    ABDOMEN [ x] [x ]    UROGENITAL [ n/a] [ n/a]    EXTREMITIES [ x] [x ]    OTHER        [ x ] I have discussed the risks and benefits and alternatives with the patient/family.  They understand and agree to proceed with plan of care.  [ x ] I have reviewed the History and Physical done within the last 30 days.  Any changes noted above.    Saurabh Tom MD  5/16/2025  6:59 AM            [1]   Medications Prior to Admission   Medication Sig Dispense Refill Last Dose/Taking    cyanocobalamin 1000 MCG Oral Tab Take 1 tablet (1,000 mcg total) by mouth daily.   5/15/2025    ciprofloxacin 500 MG Oral Tab Take 1 tablet (500 mg total) by mouth daily.   5/15/2025    eplerenone 50 MG Oral Tab Take 1 tablet (50 mg total) by mouth daily.   5/15/2025    rifAXIMin (XIFAXAN) 550 MG Oral Tab Take 1 tablet (550 mg total) by mouth 2 (two) times daily. 36 tablet 0 5/15/2025    lactulose 10 GM/15ML Oral Solution Take 45 mL (30 g total) by mouth in the morning and 45 mL (30 g total) in the evening and 45 mL (30 g total) before bedtime.   5/15/2025    furosemide 20 MG Oral Tab Take 1 tablet (20 mg total) by mouth daily. 90 tablet 0 5/15/2025    Cholecalciferol 125 MCG (5000 UT) Oral Tab Take 1  tablet (5,000 Units total) by mouth 3 (three) times a week.   5/15/2025    pantoprazole 40 MG Oral Tab EC Take 1 tablet (40 mg total) by mouth 2 (two) times daily before meals. 180 tablet 0 5/15/2025    simvastatin 40 MG Oral Tab Take 1 tablet (40 mg total) by mouth nightly. 90 tablet 3 5/15/2025    sertraline 50 MG Oral Tab 1/2 tab daily x 6 days then 1 tab daily 90 tablet 3 5/15/2025    triamcinolone 0.1 % External Cream Apply topically 2 (two) times daily as needed. Up to one week and one week break before restarting the cycle if needed 60 g 3 5/15/2025   [2]   Current Facility-Administered Medications   Medication Dose Route Frequency    glucose (Dex4) 15 GM/59ML oral liquid 15 g  15 g Oral Q15 Min PRN    Or    glucose (Glutose) 40% oral gel 15 g  15 g Oral Q15 Min PRN    Or    glucose-vitamin C (Dex-4) chewable tab 4 tablet  4 tablet Oral Q15 Min PRN    Or    dextrose 50% injection 50 mL  50 mL Intravenous Q15 Min PRN    Or    glucose (Dex4) 15 GM/59ML oral liquid 30 g  30 g Oral Q15 Min PRN    Or    glucose (Glutose) 40% oral gel 30 g  30 g Oral Q15 Min PRN    Or    glucose-vitamin C (Dex-4) chewable tab 8 tablet  8 tablet Oral Q15 Min PRN    lactated ringers infusion   Intravenous Continuous   [3]   Allergies  Allergen Reactions    Ace Inhibitors ANGIOEDEMA    Amlodipine SWELLING     Ankle edema, hard stools    Naproxen ANAPHYLAXIS     Passed out   [4]   Past Medical History:   Abdominal distention    Abdominal pain    Acquired absence of kidney    left kidney removed at 10 years old, injury from football    Bloating    Diarrhea, unspecified    Disorder of liver    Diverticulosis of large intestine    Flatulence/gas pain/belching    High blood pressure    High cholesterol    Osteoarthrosis, unspecified whether generalized or localized, lower leg        GLOBAL EXP 7-5-15 / Right Knee / [place] / [place]    S/P total knee arthroplasty    Sleep apnea    no device    Status post total right knee replacement    Type  II or unspecified type diabetes mellitus without mention of complication, not stated as uncontrolled    Uncomfortable fullness after meals    Unspecified essential hypertension    Visual impairment    Wears glasses   [5]   Past Surgical History:  Procedure Laterality Date    Abd paracentesis  10/15/2024    Colonoscopy      Kidney surgery      left nephrectomy, due to injury from football as a child    Knee replacement surgery      Knee surgery      Other  10/2011    card cath post proc, no stents     Repair cruciate ligament,knee  01/01/1998    right x4    Total knee replacement     [6]   Family History  Problem Relation Age of Onset    Colon Cancer Father     Cancer Father         colon and liver    Prostate Cancer Father     Other (pulmonary embolism) Father     Cancer Mother     Alcohol and Other Disorders Associated Mother     Crohn's Disease Mother     Other (crohn's) Mother         2009, cancer,     Other (lung removed) Mother         2013    Other (lung infection) Mother     Other (alzheimer's dementia) Mother     Other (drug dependence) Son         herion, clean now    Colon Polyps Sister     Crohn's Disease Sister     Other (lyme disease) Sister     Other ([place] virus) Sister     Other (copperhead snake bite) Sister     Prostate Cancer Brother 61        finished treatment 2/2019; recurred 2021 lymph nodes    Other (pulmonary embolism) Brother     Crohn's Disease Niece

## 2025-05-16 NOTE — OPERATIVE REPORT
EGD Operative Report  Patient Name: Brody Bowden  YOB: 1960  MRN: [id number]  Procedure: Esophagogastroduodenoscopy (EGD)  with cold forceps biopsies  Pre-operative Diagnosis & Indication: EV surveillance  Post-operative Diagnosis:   Small esophageal varices that deflate with insufflation  Nodular mucosa in the antrum and duodenal bulb  Mild portal hypertensive gastropathy  Attending Endoscopist: Saurabh Tom MD  Informed Consent: The planned procedure(s), the explanation of the procedure, its expected benefits, the potential complications and risks and possible alternatives and their benefits and risks were discussed with the patient or the patient's surrogate. The discussion of risks, not limited to but including bleeding, infection, perforation, adverse effects from anesthesia, need for emergency surgery/prolonged hospitalization,  cardiac arrhythmias,  and aspiration were discussed with patient.  Pt and/or surrogate understood the proposed procedure(s), its risks, benefits and alternatives and wish to proceed with procedure(s). All questions answered in full.  After all questions were answered to their satisfaction, a signed, informed, and witnessed consent was obtained.  Physical Exam: Heart: regular rate and rhythm. No rubs, murmurs, or gallops. Lungs: Clear to auscultation bilaterally. Abdomen: Soft, non-tender, non distended. No rebound tenderness, no guarding.   A TIME OUT WAS COMPLETED prior to the procedure to confirm the patient, procedure(s) and complete endoscopy safety procedure.  Sedation: Monitored Anesthesia Care; ASA class per anesthesiology team   Monitoring: Pulsoximetry, pulse, respirations, and blood pressure , vitals were monitored throughout the entire procedure under monitored anesthesia care.   Procedure: The patient was then brought to the endoscopy suite where his/her pulse, pulse oximetry and blood pressure were monitored. The patient was placed in the left lateral  decubitus position and deep sedation was administered. Once adequate sedation was achieved, a bite block was placed and a lubricated tip of an Olympus video upper endoscope was inserted through the oropharynx and gently manipulated through the esophagus into the stomach and the second portion of the duodenum. Upon withdrawal of the endoscope, careful visualization of the mucosa was performed. The endoscope was then withdrawn into the gastric antrum and placed in a retroflexed position.  The endoscope was then righted, and air was suctioned from the stomach.  The endoscope was then withdrawn from the patient, with careful visual inspection of the mucosa. The patient left the procedure room in stable condition for recovery. Findings and endotherapy as listed below  Toleration: Patient tolerated procedure well   Complications: No immediate complications   Technical Difficulty:  The procedure was not technically difficult   Estimated Blood Loss: Minimal, less than 5mL of estimated blood loss.   Findings and Therapeutics:  Esophagus:   Small esophageal varices were present, that deflated with insufflation, similar to his last EGD. The mucosa was normal.   There were no strictures or stenosis. GEJ junction traversed with endoscope without resistance.  The Z-line was irregular, appreciated at 40 cm from the incisors.    Stomach:    Mild portal hypertensive gastropathy was seen, as was nodular mucosa in the antrum; this nodular mucosa was biopsied with a cold forceps for histology. Otherwise. the gastric body, antrum, fundus, cardia, and angularis were normal. No ulcers, erosions or masses visualized. Endoscope was placed in a retroflexion view in the stomach. There was no evidence of a hiatal hernia or gastric varices.   Duodenum:   There was nodular mucosa in the duodenal bulb, otherwise the entire examined duodenum was normal.    Recommendations:  Post EGD precautions, watch for bleeding, infection, perforation, adverse  drug reactions   Follow-up biopsies  Avoid non-aspirin NSAID  Repeat EGD in 6 months if no liver transplant occurs    Saurabh Tom MD  5/16/2025  7:14 AM

## 2025-06-20 ENCOUNTER — HOSPITAL ENCOUNTER (EMERGENCY)
Facility: HOSPITAL | Age: 65
Discharge: HOME | End: 2025-06-20
Attending: EMERGENCY MEDICINE
Payer: MEDICARE

## 2025-06-20 VITALS
SYSTOLIC BLOOD PRESSURE: 130 MMHG | DIASTOLIC BLOOD PRESSURE: 75 MMHG | BODY MASS INDEX: 23.06 KG/M2 | WEIGHT: 174 LBS | OXYGEN SATURATION: 100 % | RESPIRATION RATE: 24 BRPM | TEMPERATURE: 99 F | HEIGHT: 73 IN | HEART RATE: 75 BPM

## 2025-06-20 DIAGNOSIS — R89.9 ABNORMAL LABORATORY TEST: Primary | ICD-10-CM

## 2025-06-20 LAB
ALBUMIN SERPL-MCNC: 3.8 G/DL (ref 3.2–4.8)
ALBUMIN/GLOB SERPL: 1.5 {RATIO} (ref 1–2)
ALP LIVER SERPL-CCNC: 232 U/L (ref 45–117)
ALT SERPL-CCNC: 36 U/L (ref 10–49)
AMMONIA PLAS-MCNC: 13 UMOL/L (ref 11–32)
ANION GAP SERPL CALC-SCNC: 7 MMOL/L (ref 0–18)
AST SERPL-CCNC: 29 U/L (ref ?–34)
BASOPHILS # BLD AUTO: 0.09 X10(3) UL (ref 0–0.2)
BASOPHILS NFR BLD AUTO: 2 %
BILIRUB SERPL-MCNC: 0.6 MG/DL (ref 0.2–1.1)
BUN BLD-MCNC: 22 MG/DL (ref 9–23)
CALCIUM BLD-MCNC: 8.6 MG/DL (ref 8.7–10.6)
CHLORIDE SERPL-SCNC: 104 MMOL/L (ref 98–112)
CO2 SERPL-SCNC: 27 MMOL/L (ref 21–32)
CREAT BLD-MCNC: 1.23 MG/DL (ref 0.7–1.3)
EGFRCR SERPLBLD CKD-EPI 2021: 65 ML/MIN/1.73M2 (ref 60–?)
EOSINOPHIL # BLD AUTO: 0.22 X10(3) UL (ref 0–0.7)
EOSINOPHIL NFR BLD AUTO: 5 %
ERYTHROCYTE [DISTWIDTH] IN BLOOD BY AUTOMATED COUNT: 16.2 %
GLOBULIN PLAS-MCNC: 2.6 G/DL (ref 2–3.5)
GLUCOSE BLD-MCNC: 97 MG/DL (ref 70–99)
HCT VFR BLD AUTO: 27 % (ref 39–53)
HGB BLD-MCNC: 8.9 G/DL (ref 13–17.5)
IMM GRANULOCYTES # BLD AUTO: 0.01 X10(3) UL (ref 0–1)
IMM GRANULOCYTES NFR BLD: 0.2 %
LYMPHOCYTES # BLD AUTO: 1.03 X10(3) UL (ref 1–4)
LYMPHOCYTES NFR BLD AUTO: 23.2 %
MCH RBC QN AUTO: 31.7 PG (ref 26–34)
MCHC RBC AUTO-ENTMCNC: 33 G/DL (ref 31–37)
MCV RBC AUTO: 96.1 FL (ref 80–100)
MONOCYTES # BLD AUTO: 0.5 X10(3) UL (ref 0.1–1)
MONOCYTES NFR BLD AUTO: 11.3 %
NEUTROPHILS # BLD AUTO: 2.59 X10 (3) UL (ref 1.5–7.7)
NEUTROPHILS # BLD AUTO: 2.59 X10(3) UL (ref 1.5–7.7)
NEUTROPHILS NFR BLD AUTO: 58.3 %
OSMOLALITY SERPL CALC.SUM OF ELEC: 289 MOSM/KG (ref 275–295)
PLATELET # BLD AUTO: 587 10(3)UL (ref 150–450)
POTASSIUM SERPL-SCNC: 4.9 MMOL/L (ref 3.5–5.1)
PROT SERPL-MCNC: 6.4 G/DL (ref 5.7–8.2)
RBC # BLD AUTO: 2.81 X10(6)UL (ref 3.8–5.8)
SODIUM SERPL-SCNC: 138 MMOL/L (ref 136–145)
WBC # BLD AUTO: 4.4 X10(3) UL (ref 4–11)

## 2025-06-20 PROCEDURE — 80053 COMPREHEN METABOLIC PANEL: CPT | Performed by: EMERGENCY MEDICINE

## 2025-06-20 PROCEDURE — 99284 EMERGENCY DEPT VISIT MOD MDM: CPT

## 2025-06-20 PROCEDURE — 80180 DRUG SCRN QUAN MYCOPHENOLATE: CPT | Performed by: EMERGENCY MEDICINE

## 2025-06-20 PROCEDURE — 80053 COMPREHEN METABOLIC PANEL: CPT

## 2025-06-20 PROCEDURE — 93010 ELECTROCARDIOGRAM REPORT: CPT

## 2025-06-20 PROCEDURE — 80197 ASSAY OF TACROLIMUS: CPT | Performed by: EMERGENCY MEDICINE

## 2025-06-20 PROCEDURE — 85025 COMPLETE CBC W/AUTO DIFF WBC: CPT | Performed by: EMERGENCY MEDICINE

## 2025-06-20 PROCEDURE — 85025 COMPLETE CBC W/AUTO DIFF WBC: CPT

## 2025-06-20 PROCEDURE — 96360 HYDRATION IV INFUSION INIT: CPT

## 2025-06-20 PROCEDURE — 93005 ELECTROCARDIOGRAM TRACING: CPT

## 2025-06-20 PROCEDURE — 82140 ASSAY OF AMMONIA: CPT | Performed by: EMERGENCY MEDICINE

## 2025-06-20 RX ORDER — MYCOPHENOLATE MOFETIL 250 MG/1
750 CAPSULE ORAL
COMMUNITY

## 2025-06-20 RX ORDER — TACROLIMUS 1 MG/1
4 CAPSULE ORAL 2 TIMES DAILY
COMMUNITY

## 2025-06-20 RX ORDER — SULFAMETHOXAZOLE AND TRIMETHOPRIM 400; 80 MG/1; MG/1
1 TABLET ORAL SEE ADMIN INSTRUCTIONS
COMMUNITY

## 2025-06-20 RX ORDER — ACETAMINOPHEN 500 MG
500 TABLET ORAL EVERY 6 HOURS
COMMUNITY

## 2025-06-20 RX ORDER — OXYCODONE HYDROCHLORIDE 5 MG/1
5 CAPSULE ORAL EVERY 4 HOURS PRN
COMMUNITY

## 2025-06-20 RX ORDER — ATORVASTATIN CALCIUM 20 MG/1
20 TABLET, FILM COATED ORAL NIGHTLY
COMMUNITY

## 2025-06-20 RX ORDER — VALGANCICLOVIR 450 MG/1
900 TABLET, FILM COATED ORAL DAILY
COMMUNITY

## 2025-06-20 RX ORDER — ASPIRIN 81 MG/1
81 TABLET ORAL DAILY
COMMUNITY

## 2025-06-20 RX ORDER — PREDNISONE 5 MG/1
5 TABLET ORAL DAILY
COMMUNITY

## 2025-06-20 NOTE — ED INITIAL ASSESSMENT (HPI)
Patient reports he had blood drawn this morning and was told his potassium level was 7. Denies chest pain.

## 2025-06-21 LAB
ATRIAL RATE: 73 BPM
P AXIS: 10 DEGREES
P-R INTERVAL: 128 MS
Q-T INTERVAL: 408 MS
QRS DURATION: 90 MS
QTC CALCULATION (BEZET): 449 MS
R AXIS: 25 DEGREES
T AXIS: 61 DEGREES
TACROLIMUS STAT: 6 MCG/L
VENTRICULAR RATE: 73 BPM

## 2025-06-21 NOTE — ED PROVIDER NOTES
Patient Seen in: [facility] Emergency Department        History  Chief Complaint   Patient presents with    Abnormal Result     Stated Complaint: liver tx 2 weeks ago, K+ 7    Subjective:   The history is provided by the patient.       64-year-old man who is now postop day #15 status post liver transplant presenting to the ER for evaluation of hyperkalemia found on outpatient labs drawn yesterday and today.  Patient had routine outpatient labs drawn that showed a potassium of 7 yesterday and 6 6 today.  He was advised to decrease his mycophenolate dose and stop taking his magnesium and come to the ER for further evaluation.  Review of systems significant for patient feeling \"loopy\", anorexia, dizzy.  Denies any nausea, vomiting, diarrhea, chest pain, shortness of breath, fever      Objective:     Past Medical History:    Abdominal distention    Abdominal pain    Acquired absence of kidney    left kidney removed at 10 years old, injury from football    Bloating    Diarrhea, unspecified    Disorder of liver    Diverticulosis of large intestine    Flatulence/gas pain/belching    High blood pressure    High cholesterol    Osteoarthrosis, unspecified whether generalized or localized, lower leg        GLOBAL EXP 7-5-15 / Right Knee / [place] / [place]    S/P total knee arthroplasty    Sleep apnea    no device    Status post total right knee replacement    Type II or unspecified type diabetes mellitus without mention of complication, not stated as uncontrolled    Uncomfortable fullness after meals    Unspecified essential hypertension    Visual impairment    Wears glasses              Past Surgical History:   Procedure Laterality Date    Abd paracentesis  10/15/2024    Colonoscopy      Kidney surgery      left nephrectomy, due to injury from football as a child    Knee replacement surgery      Knee surgery      Other  10/2011    card cath post proc, no stents     Repair cruciate ligament,knee  01/01/1998    right x4     Total knee replacement      Transplant liver,hetertopic                  Social History     Socioeconomic History    Marital status:    Tobacco Use    Smoking status: Never    Smokeless tobacco: Former    Tobacco comments:     Chews tobacco daily   Vaping Use    Vaping status: Never Used   Substance and Sexual Activity    Alcohol use: Not Currently    Drug use: No   Other Topics Concern    Caffeine Concern Yes     Comment: Coffee.Coke    Exercise Yes     Social Drivers of Health     Food Insecurity: No Food Insecurity (10/10/2024)    Food Insecurity     Food Insecurity: Never true   Transportation Needs: High Risk (5/11/2025)    [facility]    Transportation Needs     Do you have trouble getting transportation to medical appointments?: Yes   Housing Stability: Low Risk  (10/10/2024)    Housing Stability     Housing Instability: No                                Physical Exam    ED Triage Vitals   BP 06/20/25 1903 127/71   Pulse 06/20/25 1903 76   Resp 06/20/25 1903 22   Temp 06/20/25 1903 98.9 °F (37.2 °C)   Temp src 06/20/25 1903 Temporal   SpO2 06/20/25 1903 100 %   O2 Device 06/20/25 1914 None (Room air)       Current Vitals:   Vital Signs  BP: 130/75  Pulse: 75  Resp: 24  Temp: 98.9 °F (37.2 °C)  Temp src: Temporal  MAP (mmHg): 91    Oxygen Therapy  SpO2: 100 %  O2 Device: None (Room air)            Physical Exam  Vitals and nursing note reviewed.   Constitutional:       General: He is not in acute distress.     Appearance: He is well-developed.   HENT:      Head: Normocephalic and atraumatic.   Eyes:      Extraocular Movements: Extraocular movements intact.      Pupils: Pupils are equal, round, and reactive to light.   Cardiovascular:      Rate and Rhythm: Normal rate and regular rhythm.      Pulses: Normal pulses.      Heart sounds: Normal heart sounds. No murmur heard.     No gallop.   Pulmonary:      Effort: Pulmonary effort is normal. No respiratory distress.       Breath sounds: Normal breath sounds.   Abdominal:      General: Abdomen is flat.      Palpations: Abdomen is soft.      Tenderness: There is no abdominal tenderness.          Comments: Large abdominal incision noted, staples are in place, clean dry and intact   Musculoskeletal:      Cervical back: Neck supple.   Skin:     General: Skin is warm and dry.      Capillary Refill: Capillary refill takes less than 2 seconds.   Neurological:      General: No focal deficit present.      Mental Status: He is alert and oriented to person, place, and time.   Psychiatric:         Mood and Affect: Mood normal.         Behavior: Behavior normal.                 ED Course  Labs Reviewed   CBC WITH DIFFERENTIAL WITH PLATELET - Abnormal; Notable for the following components:       Result Value    RBC 2.81 (*)     HGB 8.9 (*)     HCT 27.0 (*)     .0 (*)     All other components within normal limits   COMP METABOLIC PANEL (14) - Abnormal; Notable for the following components:    Calcium, Total 8.6 (*)     Alkaline Phosphatase 232 (*)     All other components within normal limits   AMMONIA, PLASMA - Normal   TACROLIMUS STAT   MYCOPHENOLIC ACID AND METAB.   RAINBOW DRAW LAVENDER   RAINBOW DRAW LIGHT GREEN   RAINBOW DRAW BLUE                ED Course as of 06/21/25 0043  ------------------------------------------------------------  Time: 06/20 1908  Comment: EKG INTERPRETATION  Time: 6:39 PM  Normal sinus rhythm, ventricular rate is 73  Normal axis  Normal FL, QRS, and QTc intervals  No chamber enlargement  Normal ST-T segments  No prior EKG's available for comparison.  I, the emergency physician, independently interpreted this EKG in the absence of a cardiologist.    ------------------------------------------------------------  Time: 06/20 2022  Comment: Spoke to transplant fellow, appreciate recommendations, advised to keep regimen as is and hold Bactrim.  Will reach out for repeat lab testing next  week.  ------------------------------------------------------------  Time: 06/20 2036  Comment: I independently interpreted labs, LFTs and ammonia level within normal limits.  Patient has a normocytic anemia, hemoglobin is 8.9 and is at baseline compared to outpatient labs at [facility].                       MDM     #Abnormal labs  Patient had routine labs drawn that showed hyperkalemia x 2 yesterday and today, may be hemolysis related but unable to prove since these labs were done at an outside facility    Potassium today is 4.9, renal function is normal, no signs of any acute liver rejection at this time  Reassurance provided.    Transplant fellow on-call notified, we discussed case, it was also recommended that patient continue taking his mycophenolate 500 mg 3 times daily, hold magnesium and hold Bactrim for now and follow-up for repeat outpatient lab testing        Medical Decision Making      Disposition and Plan     Clinical Impression:  1. Abnormal laboratory test         Disposition:  Discharge  6/20/2025  8:46 pm    Follow-up:  No follow-up provider specified.        Medications Prescribed:  Discharge Medication List as of 6/20/2025  8:50 PM                Supplementary Documentation:

## 2025-06-21 NOTE — DISCHARGE INSTRUCTIONS
Contact your transplant nurse coordinator to arrange for reassessment of your symptoms and repeat lab tests

## 2025-06-25 LAB
MYCOPHENOLIC ACID GLUCURONIDE: 58 MCG/ML
MYCOPHENOLIC ACID: <0.5 MCG/ML

## 2025-08-01 ENCOUNTER — DOCUMENTATION ONLY (OUTPATIENT)
Dept: FAMILY MEDICINE CLINIC | Facility: CLINIC | Age: 65
End: 2025-08-01

## 2025-08-05 ENCOUNTER — TELEPHONE (OUTPATIENT)
Dept: FAMILY MEDICINE CLINIC | Facility: CLINIC | Age: 65
End: 2025-08-05

## (undated) DEVICE — 10FT COMBINED O2 DELIVERY/CO2 MONITORING. FILTER WITH MICROSTREAM TYPE LUER: Brand: DUAL ADULT NASAL CANNULA

## (undated) DEVICE — 3M™ RED DOT™ MONITORING ELECTRODE WITH FOAM TAPE AND STICKY GEL 2570-3, 3/BAG, 200/CASE, 54/PLT: Brand: RED DOT™

## (undated) DEVICE — KIT VLV 5 PC AIR H2O SUCT BX ENDOGATOR CONN

## (undated) DEVICE — KIT CUSTOM ENDOPROCEDURE STERIS

## (undated) DEVICE — 1200CC GUARDIAN II: Brand: GUARDIAN

## (undated) DEVICE — GIJAW SINGLE-USE BIOPSY FORCEPS WITH NEEDLE: Brand: GIJAW

## (undated) DEVICE — BITEBLOCK ENDOSCP 60FR MAXI STRP

## (undated) DEVICE — V2 SPECIMEN COLLECTION MANIFOLD KIT: Brand: NEPTUNE

## (undated) NOTE — LETTER
02/15/21        Ladi Velásquez [id number]      Dear Dwayne Giles records indicate that you have outstanding lab work and or testing that was ordered for you and has not yet been completed:  Orders Placed This Encounter      Comp M

## (undated) NOTE — MR AVS SNAPSHOT
[address]  [address] [address] [phone]               Thank you for choosing us for your health care visit with Kleber Shen MD.  We are glad to serve you and happy to provide you with this Commonly known as:  ZOCOR                Where to Get Your Medications      These medications were sent to [address], [place] - [address] [facility]  [address], [address] [facility]     Phone:  [phone]    - Start activities slowly and build up over time Do what you like   Get your heart pumping – brisk walking, biking, swimming Even 10 minute increments are effective and add up over the week   2 ½ hours per week – spread out over time Use a may to keep you